# Patient Record
Sex: FEMALE | Race: ASIAN | NOT HISPANIC OR LATINO | Employment: PART TIME | URBAN - METROPOLITAN AREA
[De-identification: names, ages, dates, MRNs, and addresses within clinical notes are randomized per-mention and may not be internally consistent; named-entity substitution may affect disease eponyms.]

---

## 2017-09-14 ENCOUNTER — OFFICE VISIT (OUTPATIENT)
Dept: OPTOMETRY | Facility: CLINIC | Age: 62
End: 2017-09-14
Payer: COMMERCIAL

## 2017-09-14 DIAGNOSIS — H52.03 HYPEROPIA WITH ASTIGMATISM AND PRESBYOPIA, BILATERAL: Primary | ICD-10-CM

## 2017-09-14 DIAGNOSIS — H04.123 DRY EYES: ICD-10-CM

## 2017-09-14 DIAGNOSIS — H25.043 POSTERIOR SUBCAPSULAR AGE-RELATED CATARACT OF BOTH EYES: ICD-10-CM

## 2017-09-14 DIAGNOSIS — H52.203 HYPEROPIA WITH ASTIGMATISM AND PRESBYOPIA, BILATERAL: Primary | ICD-10-CM

## 2017-09-14 DIAGNOSIS — H25.13 NUCLEAR SCLEROSIS OF BOTH EYES: ICD-10-CM

## 2017-09-14 DIAGNOSIS — H02.403 PTOSIS, BILATERAL: ICD-10-CM

## 2017-09-14 DIAGNOSIS — H52.4 HYPEROPIA WITH ASTIGMATISM AND PRESBYOPIA, BILATERAL: Primary | ICD-10-CM

## 2017-09-14 PROCEDURE — 92015 DETERMINE REFRACTIVE STATE: CPT | Performed by: OPTOMETRIST

## 2017-09-14 PROCEDURE — 92004 COMPRE OPH EXAM NEW PT 1/>: CPT | Performed by: OPTOMETRIST

## 2017-09-14 ASSESSMENT — REFRACTION_MANIFEST
OD_CYLINDER: +1.50
OD_AXIS: 007
OS_CYLINDER: +1.25
OS_SPHERE: +1.25
OS_CYLINDER: +1.50
OS_AXIS: 007
OD_SPHERE: +1.50
METHOD_AUTOREFRACTION: 1
OS_AXIS: 001
OD_CYLINDER: +1.25
OS_ADD: +2.25
OS_SPHERE: +1.50
OD_ADD: +2.25
OD_SPHERE: +1.75
OD_AXIS: 005

## 2017-09-14 ASSESSMENT — TONOMETRY
OS_IOP_MMHG: 15
IOP_METHOD: APPLANATION
OD_IOP_MMHG: 15

## 2017-09-14 ASSESSMENT — CONF VISUAL FIELD
OD_NORMAL: 1
OS_NORMAL: 1
METHOD: COUNTING FINGERS

## 2017-09-14 ASSESSMENT — REFRACTION_WEARINGRX
OS_SPHERE: +3.25
OS_AXIS: 001
OD_CYLINDER: +1.00
OD_AXIS: 004
OD_SPHERE: +3.50
SPECS_TYPE: SVL
OS_CYLINDER: +0.75

## 2017-09-14 ASSESSMENT — EXTERNAL EXAM - RIGHT EYE: OD_EXAM: NORMAL

## 2017-09-14 ASSESSMENT — VISUAL ACUITY
OS_SC+: -1
CORRECTION_TYPE: GLASSES
OD_SC: 20/50
OS_SC: 20/50
METHOD: SNELLEN - LINEAR
OD_CC: J2
OS_CC: J1
OD_SC+: -2

## 2017-09-14 ASSESSMENT — EXTERNAL EXAM - LEFT EYE: OS_EXAM: NORMAL

## 2017-09-14 ASSESSMENT — CUP TO DISC RATIO
OS_RATIO: 0.3
OD_RATIO: 0.3

## 2017-09-14 NOTE — PROGRESS NOTES
Chief Complaint   Patient presents with     COMPREHENSIVE EYE EXAM      Accompanied by   Last Eye Exam: 3 years ago  Dilated Previously: No, side effects of dilation explained today    What are you currently using to see?  Glasses for reading       Distance Vision Acuity: Satisfied with vision    Near Vision Acuity: Not satisfied - When she wears her glasses for reading she gets headaches    Eye Comfort: burning in the morning  Do you use eye drops? : No  Occupation or Hobbies: [employer]  [employer] [facility]            Medical, surgical and family histories reviewed and updated 9/14/2017.       OBJECTIVE: See Ophthalmology exam    ASSESSMENT:    ICD-10-CM    1. Hyperopia with astigmatism and presbyopia, bilateral H52.03 EYE EXAM (SIMPLE-NONBILLABLE)    H52.203 REFRACTION    H52.4    2. Nuclear sclerosis of both eyes H25.13 EYE EXAM (SIMPLE-NONBILLABLE)   3. Dry eyes H04.123 EYE EXAM (SIMPLE-NONBILLABLE)   4. Ptosis, bilateral H02.403 EYE EXAM (SIMPLE-NONBILLABLE)     OPHTHALMOLOGY ADULT REFERRAL   5. Posterior subcapsular age-related cataract of both eyes H25.043       PLAN:     Patient Instructions   Prescription given for glasses.    Use a small amount of Refresh PM ointment in both eyes every night before bed.    You could try taking 1000 to 2000 mg of fish oil daily by mouth to improve tear layer.    Referral to Dr. Coles for eyelid evaluation.    Mild cataracts in both eyes. Protect eyes from the sun with sunglasses or a hat.

## 2017-09-14 NOTE — PATIENT INSTRUCTIONS
Prescription given for glasses.    Use a small amount of Refresh PM ointment in both eyes every night before bed.    You could try taking 1000 to 2000 mg of fish oil daily by mouth to improve tear layer.    Referral to Dr. Coles for eyelid evaluation.    Mild cataracts in both eyes. Protect eyes from the sun with sunglasses or a hat.

## 2017-09-14 NOTE — MR AVS SNAPSHOT
After Visit Summary   9/14/2017    Margaret Healy    MRN: [id number]           Patient Information     Date Of Birth          1955        Visit Information        Provider Department      9/14/2017 10:30 AM Laisha Rodriguez OD; [employer] [facility]        Today's Diagnoses     Hyperopia with astigmatism and presbyopia, bilateral    -  1    Nuclear sclerosis of both eyes        Dry eyes        Ptosis, bilateral          Care Instructions    Prescription given for glasses.    Use a small amount of Refresh PM ointment in both eyes every night before bed.    You could try taking 1000 to 2000 mg of fish oil daily by mouth to improve tear layer.    Referral to Dr. Coles for eyelid evaluation.    Mild cataracts in both eyes. Protect eyes from the sun with sunglasses or a hat.              Follow-ups after your visit        Your next 10 appointments already scheduled     Sep 22, 2017  9:20 AM CDT   PHYSICAL with JAQUAN Pa CNP   [facility]    [address] [place] [id number] [phone]              Who to contact     If you have questions or need follow up information about today's clinic visit or your schedule please contact [facility] directly at [phone].  Normal or non-critical lab and imaging results will be communicated to you by MyChart, letter or phone within 4 business days after the clinic has received the results. If you do not hear from us within 7 days, please contact the clinic through MyChart or phone. If you have a critical or abnormal lab result, we will notify you by phone as soon as possible.  Submit refill requests through [employer] or call your pharmacy and they will forward the refill request to us. Please allow 3 business days for your refill to be completed.          Additional Information About Your Visit        MyChart Information   "   [employer] lets you send messages to your doctor, view your test results, renew your prescriptions, schedule appointments and more. To sign up, go to [email or web address] . Click on \"Log in\" on the left side of the screen, which will take you to the Welcome page. Then click on \"Sign up Now\" on the right side of the page.     You will be asked to enter the access code listed below, as well as some personal information. Please follow the directions to create your username and password.     Your access code is: [email or web address]  Expires: 12/10/2017  9:46 AM     Your access code will  in 90 days. If you need help or a new code, please call your [place] clinic or [phone].        Care EveryWhere ID     This is your Care EveryWhere ID. This could be used by other organizations to access your [place] medical records  [id number]         Blood Pressure from Last 3 Encounters:   No data found for BP    Weight from Last 3 Encounters:   No data found for Wt              Today, you had the following     No orders found for display       Primary Care Provider    None Specified       No primary provider on file.        Equal Access to Services     [facility]: Hadii vivek Cortez, wasofieda shanelle, qajoseta ameliaalmaria c mcgee, don bass . So [facility] [phone].    ATENCIÓN: Si habla español, tiene a arteaga disposición servicios gratuitos de asistencia lingüística. Llame al [phone].    We comply with applicable federal civil rights laws and [place] laws. We do not discriminate on the basis of race, color, national origin, age, disability sex, sexual orientation or gender identity.            Thank you!     Thank you for choosing [facility]  for your care. Our goal is always to provide you with excellent care. Hearing back from our patients is one way we can continue to improve our services. Please take a few minutes to complete the written survey that you " may receive in the mail after your visit with us. Thank you!             Your Updated Medication List - Protect others around you: Learn how to safely use, store and throw away your medicines at www.disposemymeds.org.          This list is accurate as of: 9/14/17 11:45 AM.  Always use your most recent med list.                   Brand Name Dispense Instructions for use Diagnosis    biotin 2.5 mg/mL Susp      Take by mouth daily        FISH OIL PO           VITAMIN D (CHOLECALCIFEROL) PO      Take by mouth daily

## 2017-09-22 ENCOUNTER — OFFICE VISIT (OUTPATIENT)
Dept: FAMILY MEDICINE | Facility: CLINIC | Age: 62
End: 2017-09-22
Payer: COMMERCIAL

## 2017-09-22 VITALS
BODY MASS INDEX: 21.09 KG/M2 | HEART RATE: 71 BPM | SYSTOLIC BLOOD PRESSURE: 115 MMHG | TEMPERATURE: 97.8 F | HEIGHT: 59 IN | DIASTOLIC BLOOD PRESSURE: 74 MMHG | WEIGHT: 104.6 LBS | OXYGEN SATURATION: 97 %

## 2017-09-22 DIAGNOSIS — Z12.31 VISIT FOR SCREENING MAMMOGRAM: ICD-10-CM

## 2017-09-22 DIAGNOSIS — Z00.00 ROUTINE HISTORY AND PHYSICAL EXAMINATION OF ADULT: Primary | ICD-10-CM

## 2017-09-22 DIAGNOSIS — Z11.3 SCREEN FOR STD (SEXUALLY TRANSMITTED DISEASE): ICD-10-CM

## 2017-09-22 DIAGNOSIS — Z12.4 SCREENING FOR MALIGNANT NEOPLASM OF CERVIX: ICD-10-CM

## 2017-09-22 DIAGNOSIS — Z13.6 CARDIOVASCULAR SCREENING; LDL GOAL LESS THAN 160: ICD-10-CM

## 2017-09-22 DIAGNOSIS — R42 DIZZINESS: ICD-10-CM

## 2017-09-22 DIAGNOSIS — Z23 NEED FOR PROPHYLACTIC VACCINATION AND INOCULATION AGAINST INFLUENZA: ICD-10-CM

## 2017-09-22 LAB
ALBUMIN SERPL-MCNC: 3.9 G/DL (ref 3.4–5)
ALP SERPL-CCNC: 108 U/L (ref 40–150)
ALT SERPL W P-5'-P-CCNC: 24 U/L (ref 0–50)
ANION GAP SERPL CALCULATED.3IONS-SCNC: 8 MMOL/L (ref 3–14)
AST SERPL W P-5'-P-CCNC: 25 U/L (ref 0–45)
BILIRUB SERPL-MCNC: 1 MG/DL (ref 0.2–1.3)
BUN SERPL-MCNC: 9 MG/DL (ref 7–30)
CALCIUM SERPL-MCNC: 8.9 MG/DL (ref 8.5–10.1)
CHLORIDE SERPL-SCNC: 102 MMOL/L (ref 94–109)
CHOLEST SERPL-MCNC: 233 MG/DL
CO2 SERPL-SCNC: 29 MMOL/L (ref 20–32)
CREAT SERPL-MCNC: 0.69 MG/DL (ref 0.52–1.04)
ERYTHROCYTE [DISTWIDTH] IN BLOOD BY AUTOMATED COUNT: 12.1 % (ref 10–15)
FERRITIN SERPL-MCNC: 105 NG/ML (ref 8–252)
FOLATE SERPL-MCNC: 23.9 NG/ML
GFR SERPL CREATININE-BSD FRML MDRD: 87 ML/MIN/1.7M2
GLUCOSE SERPL-MCNC: 91 MG/DL (ref 70–99)
HCT VFR BLD AUTO: 37.4 % (ref 35–47)
HDLC SERPL-MCNC: 72 MG/DL
HGB BLD-MCNC: 12.7 G/DL (ref 11.7–15.7)
IRON SATN MFR SERPL: 45 % (ref 15–46)
IRON SERPL-MCNC: 159 UG/DL (ref 35–180)
LDLC SERPL CALC-MCNC: 129 MG/DL
MCH RBC QN AUTO: 32.2 PG (ref 26.5–33)
MCHC RBC AUTO-ENTMCNC: 34 G/DL (ref 31.5–36.5)
MCV RBC AUTO: 95 FL (ref 78–100)
NONHDLC SERPL-MCNC: 161 MG/DL
PLATELET # BLD AUTO: 201 10E9/L (ref 150–450)
POTASSIUM SERPL-SCNC: 3.9 MMOL/L (ref 3.4–5.3)
PROT SERPL-MCNC: 8.3 G/DL (ref 6.8–8.8)
RBC # BLD AUTO: 3.94 10E12/L (ref 3.8–5.2)
SODIUM SERPL-SCNC: 139 MMOL/L (ref 133–144)
SPECIMEN SOURCE: NORMAL
TIBC SERPL-MCNC: 351 UG/DL (ref 240–430)
TRIGL SERPL-MCNC: 161 MG/DL
TSH SERPL DL<=0.005 MIU/L-ACNC: 0.66 MU/L (ref 0.4–4)
VIT B12 SERPL-MCNC: 567 PG/ML (ref 193–986)
WBC # BLD AUTO: 4.6 10E9/L (ref 4–11)
WET PREP SPEC: NORMAL

## 2017-09-22 PROCEDURE — 87591 N.GONORRHOEAE DNA AMP PROB: CPT | Performed by: NURSE PRACTITIONER

## 2017-09-22 PROCEDURE — 90686 IIV4 VACC NO PRSV 0.5 ML IM: CPT | Performed by: NURSE PRACTITIONER

## 2017-09-22 PROCEDURE — G0476 HPV COMBO ASSAY CA SCREEN: HCPCS | Performed by: NURSE PRACTITIONER

## 2017-09-22 PROCEDURE — 99396 PREV VISIT EST AGE 40-64: CPT | Mod: 25 | Performed by: NURSE PRACTITIONER

## 2017-09-22 PROCEDURE — 90471 IMMUNIZATION ADMIN: CPT | Performed by: NURSE PRACTITIONER

## 2017-09-22 PROCEDURE — G0123 SCREEN CERV/VAG THIN LAYER: HCPCS | Performed by: NURSE PRACTITIONER

## 2017-09-22 PROCEDURE — 82728 ASSAY OF FERRITIN: CPT | Performed by: NURSE PRACTITIONER

## 2017-09-22 PROCEDURE — 80053 COMPREHEN METABOLIC PANEL: CPT | Performed by: NURSE PRACTITIONER

## 2017-09-22 PROCEDURE — 84443 ASSAY THYROID STIM HORMONE: CPT | Performed by: NURSE PRACTITIONER

## 2017-09-22 PROCEDURE — 83540 ASSAY OF IRON: CPT | Performed by: NURSE PRACTITIONER

## 2017-09-22 PROCEDURE — 82607 VITAMIN B-12: CPT | Performed by: NURSE PRACTITIONER

## 2017-09-22 PROCEDURE — 85027 COMPLETE CBC AUTOMATED: CPT | Performed by: NURSE PRACTITIONER

## 2017-09-22 PROCEDURE — 83550 IRON BINDING TEST: CPT | Performed by: NURSE PRACTITIONER

## 2017-09-22 PROCEDURE — 82746 ASSAY OF FOLIC ACID SERUM: CPT | Performed by: NURSE PRACTITIONER

## 2017-09-22 PROCEDURE — 99213 OFFICE O/P EST LOW 20 MIN: CPT | Mod: 25 | Performed by: NURSE PRACTITIONER

## 2017-09-22 PROCEDURE — 36415 COLL VENOUS BLD VENIPUNCTURE: CPT | Performed by: NURSE PRACTITIONER

## 2017-09-22 PROCEDURE — 80061 LIPID PANEL: CPT | Performed by: NURSE PRACTITIONER

## 2017-09-22 PROCEDURE — 87491 CHLMYD TRACH DNA AMP PROBE: CPT | Performed by: NURSE PRACTITIONER

## 2017-09-22 PROCEDURE — 87210 SMEAR WET MOUNT SALINE/INK: CPT | Performed by: NURSE PRACTITIONER

## 2017-09-22 NOTE — PROGRESS NOTES
SUBJECTIVE:   CC: Margaret Healy is an 62 year old woman who presents for preventive health visit.     Healthy Habits:    Do you get at least three servings of calcium containing foods daily (dairy, green leafy vegetables, etc.)? yes    Amount of exercise or daily activities, outside of work: 7 day(s) per week    Problems taking medications regularly No    Medication side effects: No    Have you had an eye exam in the past two years? yes    Do you see a dentist twice per year? yes    Do you have sleep apnea, excessive snoring or daytime drowsiness?yes      Concern: Dizziness- intermittent 1-2 days/month, worse in the am and lasts  A couple minutes, symptoms worse with position changes and when she turns her head.  She reports history of possible allergy symptoms at the time she has symptoms, no /hx of inner ear problems.  No falls, no weakness, nausea, vomiting, vision changes, numbness or tingling.  She was recently seen by Optometry and the power of her eyeglasses was decreased.  She  Has history of anemia- thought to be due to poor nutrition.    Today's PHQ-2 Score: PHQ-2 ( 1999 Pfizer) 9/22/2017   Q1: Little interest or pleasure in doing things 0   Q2: Feeling down, depressed or hopeless 0   PHQ-2 Score 0         Abuse: Current or Past(Physical, Sexual or Emotional)- No  Do you feel safe in your environment - Yes  Social History   Substance Use Topics     Smoking status: Never Smoker     Smokeless tobacco: Never Used     Alcohol use No     The patient does not drink >3 drinks per day nor >7 drinks per week.    Reviewed orders with patient.  Reviewed health maintenance and updated orders accordingly - Yes  BP Readings from Last 3 Encounters:   09/22/17 116/67    Wt Readings from Last 3 Encounters:   09/22/17 104 lb 9.6 oz (47.4 kg)                  There is no problem list on file for this patient.    History reviewed. No pertinent surgical history.    Social History   Substance Use Topics     Smoking status:  "Never Smoker     Smokeless tobacco: Never Used     Alcohol use No     Family History   Problem Relation Age of Onset     Cataracts Mother      Hypertension Mother      Glaucoma No family hx of      Macular Degeneration No family hx of                Patient over age 50, mutual decision to screen reflected in health maintenance.      Pertinent mammograms are reviewed under the imaging tab.  History of abnormal Pap smear: NO - age 30- 65 PAP every 3 years recommended    Reviewed and updated as needed this visit by clinical staffTobacco  Allergies  Meds  Med Hx  Surg Hx  Fam Hx  Soc Hx        Reviewed and updated as needed this visit by Provider        History reviewed. No pertinent past medical history.   History reviewed. No pertinent surgical history.    ROS:  C: NEGATIVE for fever, chills, change in weight  I: NEGATIVE for worrisome rashes, moles or lesions  E: NEGATIVE for vision changes or irritation  ENT: NEGATIVE for ear, mouth and throat problems  R: NEGATIVE for significant cough or SOB  B: NEGATIVE for masses, tenderness or discharge  CV: NEGATIVE for chest pain, palpitations or peripheral edema  GI: NEGATIVE for nausea, abdominal pain, heartburn, or change in bowel habits  : NEGATIVE for unusual urinary or vaginal symptoms. No vaginal bleeding.  M: NEGATIVE for significant arthralgias or myalgia  N: NEGATIVE for weakness, dizziness or paresthesias  P: NEGATIVE for changes in mood or affect     OBJECTIVE:   /67 (BP Location: Left arm, Patient Position: Sitting, Cuff Size: Adult Regular)  Pulse 74  Temp 97.8  F (36.6  C) (Oral)  Ht 4' 11.45\" (1.51 m)  Wt 104 lb 9.6 oz (47.4 kg)  SpO2 97%  BMI 20.81 kg/m2  EXAM:  GENERAL APPEARANCE: healthy, alert and no distress  EYES: Eyes grossly normal to inspection, PERRL and conjunctivae and sclerae normal  HENT: ear canals and TM's normal, nose and mouth without ulcers or lesions, oropharynx clear and oral mucous membranes moist  NECK: no adenopathy, " no asymmetry, masses, or scars and thyroid normal to palpation  RESP: lungs clear to auscultation - no rales, rhonchi or wheezes  BREAST: normal without masses, tenderness or nipple discharge and no palpable axillary masses or adenopathy  CV: regular rate and rhythm, normal S1 S2, no S3 or S4, no murmur, click or rub, no peripheral edema and peripheral pulses strong  ABDOMEN: soft, nontender, no hepatosplenomegaly, no masses and bowel sounds normal   (female): normal female external genitalia, normal urethral meatus, vaginal mucosal atrophy noted, normal cervix, adnexae, and uterus without masses or abnormal discharge, wet prep, pap, GC obtained.  MS: no musculoskeletal defects are noted and gait is age appropriate without ataxia  SKIN: no suspicious lesions or rashes  NEURO: Normal strength and tone, sensory exam grossly normal, mentation intact and speech normal  PSYCH: mentation appears normal and affect normal/bright    ASSESSMENT/PLAN:   1. Routine history and physical examination of adult      2. Dizziness  Not orthostatic, make position changes slowly, reviewed home safety, checking labs today.  Return to clinic if not improved, new, or worsening symptoms.   - Comprehensive metabolic panel (BMP + Alb, Alk Phos, ALT, AST, Total. Bili, TP)  - CBC with platelets  - Ferritin  - Iron and iron binding capacity  - Folate  - Vitamin B12  - TSH with free T4 reflex    3. CARDIOVASCULAR SCREENING; LDL GOAL LESS THAN 160    - Lipid panel reflex to direct LDL    4. Screening for malignant neoplasm of cervix    - Pap imaged thin layer screen with HPV - recommended age 30 - 65 years (select HPV order below)    5. Visit for screening mammogram    - MA SCREENING DIGITAL BILAT - Future  (s+30); Future    6. Need for prophylactic vaccination and inoculation against influenza    7.  Screen std  -GC/wet prep obtained at patient request    COUNSELING:   Reviewed preventive health counseling, as reflected in patient  "instructions       Regular exercise       Healthy diet/nutrition       Immunizations    Vaccinated for: Influenza           Osteoporosis Prevention/Bone Health       Safe sex practices/STD prevention           reports that she has never smoked. She has never used smokeless tobacco.    Estimated body mass index is 20.81 kg/(m^2) as calculated from the following:    Height as of this encounter: 4' 11.45\" (1.51 m).    Weight as of this encounter: 104 lb 9.6 oz (47.4 kg).         Counseling Resources:  ATP IV Guidelines  Pooled Cohorts Equation Calculator  Breast Cancer Risk Calculator  FRAX Risk Assessment  ICSI Preventive Guidelines  Dietary Guidelines for Americans, 2010  USDA's MyPlate  ASA Prophylaxis  Lung CA Screening    JAQUAN Hall [facility]  "

## 2017-09-22 NOTE — PATIENT INSTRUCTIONS
Based on your medical history and these are the current health maintenance or preventive care services that you are due for (some may have been done at this visit)  Health Maintenance Due   Topic Date Due     HEPATITIS C SCREENING  08/07/1973     PAP SCREENING Q3 YR (SYSTEM ASSIGNED)  08/07/1976     LIPID SCREEN Q5 YR FEMALE (SYSTEM ASSIGNED)  08/07/2000     MAMMO SCREEN Q2 YR (SYSTEM ASSIGNED)  08/07/2005     COLON CANCER SCREEN (SYSTEM ASSIGNED)  08/07/2005     ADVANCE DIRECTIVE PLANNING Q5 YRS  08/07/2010     INFLUENZA VACCINE (SYSTEM ASSIGNED)  09/01/2017         At [facility], we strive to deliver an exceptional experience to you, every time we see you.    If you receive a survey in the mail, please send us back your thoughts. We really do value your feedback.    Your care team's suggested websites for health information:  Www.[employer] : Up to date and easily searchable information on multiple topics.  Www.medlineplus.gov : medication info, interactive tutorials, watch real surgeries online  Www.familydoctor.org : good info from the Academy of Family Physicians  Www.cdc.gov : public health info, travel advisories, epidemics (H1N1)  Www.aap.org : children's health info, normal development, vaccinations  Www.health.[facility].us : [place] dept of health, public health issues in [place], N1N1    How to contact your care team:   Álvaro Maldonado/Jaret [phone]         Pharmacy [phone]    Dr. Mcgarry, Rosalia Cooley PA-C, Dr. Montalvo, Saray PARTIDA CNP, Mya Corey PA-C, Dr. Roe, and JAQUAN Quach CNP    Team RN: [place]      Clinic hours  M-Th 7 am-7 pm   Fri 7 am-5 pm.   Urgent care M-F 11 am-9 pm,   Sat/Sun 9 am-5 pm.  Pharmacy M-Th 8 am-8 pm Fri 8 am-6 pm  Sat/Sun 9 am-5 pm.     All password changes, disabled accounts, or ID changes in SMS Assistt/MyHealth will be done by our Access Services Department.    If you need help with your account or password, call:  [phone]. Clinic staff no longer has the ability to change passwords.     Preventive Health Recommendations  Female Ages 50 - 64    Yearly exam: See your health care provider every year in order to  o Review health changes.   o Discuss preventive care.    o Review your medicines if your doctor has prescribed any.      Get a Pap test every three years (unless you have an abnormal result and your provider advises testing more often).    If you get Pap tests with HPV test, you only need to test every 5 years, unless you have an abnormal result.     You do not need a Pap test if your uterus was removed (hysterectomy) and you have not had cancer.    You should be tested each year for STDs (sexually transmitted diseases) if you're at risk.     Have a mammogram every 1 to 2 years.    Have a colonoscopy at age 50, or have a yearly FIT test (stool test). These exams screen for colon cancer.      Have a cholesterol test every 5 years, or more often if advised.    Have a diabetes test (fasting glucose) every three years. If you are at risk for diabetes, you should have this test more often.     If you are at risk for osteoporosis (brittle bone disease), think about having a bone density scan (DEXA).    Shots: Get a flu shot each year. Get a tetanus shot every 10 years.    Nutrition:     Eat at least 5 servings of fruits and vegetables each day.    Eat whole-grain bread, whole-wheat pasta and brown rice instead of white grains and rice.    Talk to your provider about Calcium and Vitamin D.     Lifestyle    Exercise at least 150 minutes a week (30 minutes a day, 5 days a week). This will help you control your weight and prevent disease.    Limit alcohol to one drink per day.    No smoking.     Wear sunscreen to prevent skin cancer.     See your dentist every six months for an exam and cleaning.    See your eye doctor every 1 to 2 years.    Chóng M?t [Dizziness, Uncertain Cause]  Chóng m?t (dizziness) là m?t tri?u ch? ng  "thông th? ? ng ?ôi khi ?? ?c mô t? nh? là  choáng váng  (\"lightheadedness\") ho?c c? m giác nh? qu ý v? s?p ng?t x?u. N?u chóng m?t ch? kéo dài m?t vài giây ho? c liên lakisha ? ?n s? thay ? ?i v? trí ( nh? ? ?ng d?y tia khi n?m ho?c ng?i m?t lúc lâu), thì nó th ? ?ng không ph?i là d?u hi?u c?a m? t ?i ?u gì ?ó tr ?m tr?ng. N?u chóng m?t kéo dài t? vài phút ? ?n vài gi?, ho?c ti?p t?c mà không có lý do gì rõ ràng thì có th? là d?u hi?u c?a m?t v? n ? ? tr?m tr? ng h?n ( nh? m ? t n? ?c, ph?n ?ng v?i thu?c, b?nh peggy ho?c não).  Cu? c th?m khám ngày hôm nay không cho th?y nguyên nhân chính xác c? a c?n chóng m ?t c?a quý v? . ?ôi lúc c ?n làm các xét barbie?m b? sung tr? ?c khi tìm ra ?? ?c nguyên nhân. Do ?ó, ?i ?u lakisha tr?ng là quý v? ph?i ti?p t?c devante dõi v?i bác s? c?a quý v? n?u các tri?u ch?ng ti?p t?c.  Ch?m Sóc T?i Stacy:  1) N?u m? t c?n chóng m ?t x?y ra và kéo dài quá vài giây, hãy n?m del angel? ng cho ? ? n khi c?n chóng m ? t taryn ?i. N?u quý v? n?m del angel?ng, quý v? s? không th? t? làm cho mình t? n th??ng b ?ng cách té del angel?ng n?u quý v? ng?t x?u.  2) ??ng lái xe ho? c ?i ?u khi?n thi?t b? gì nguy hi?m cho ? ?n khi d?t c?n chóng m?t ít nh?t là 48 gi?.  3) N? u c?n chóng m?t x?y ra khi ? ?ng d? y ? ?t ng?t thì ?ó có th ? là d?u hi?u c?a s? m? t n? ?c nh?. U?ng thêm ch?t l?ng lorraine nh?ng ngày ti? p ?ó .  4) N?u quý v? v?a m?i b? t ? ?u dùng m?t lo?i thu?c m?i ho?c n?u quý v? ? ã t ?ng li ? u l? ?ng thu?c hi?n dùng ( ? ?c bi?t là thu?c tr? burgess lilo?t áp), hãy nói navya?n v?i bác s? kê toa v? các tri?u ch?ng c?a quý v?. Có th? ph?i c? n ?i ?u ch?nh li? u l? ?ng thu?c.  Ti?p T?c Devante Dõi  v?i bác s? c?a quý v? ? ? th? m ? ?nh thêm lorraine vòng b?y ngày t?i n?u các tri?u ch?ng c?a quý v? ti?p t?c.  N?u x?y ra b?t c? ?i?u nào tia ?ây hãy L?P T?C ?I?U TR? Y T?:  -- Các tri?u ch?ng c?a quý v? t? h?n  -- Ng?t x?u, nh? c ? ?u ho?c ? ?ng kinh  -- Ói m?a bernardino?u l?n  -- Có c? m giác nh? là qu ý v? ho? c c?n ph òng ?ang connie tr òn  -- ?michell cervantes " ?c, cánh soila, c? , l?ng ho ?c hàm  -- ?ánh tr ?ng ng?c (c?m giác là peggy c?a quý v? rung, ? ?p nhanh, ho?c m?nh)  -- H?i th ? ng?n  -- Có máu lorraine ch?t ói m?a ho?c lorraine phâ n (màu ?en hay ? ?)  -- Y?u m?t cánh soila ho?c m?t c?ng chân ho?c m?t bên m?t  -- Nói ho?c nhìn khó kh ?n  Date Last Reviewed: 8/23/2015 2000-2017 [employer]. [address] [place] [address], [place] [address]. All rights reserved. This information is not intended as a substitute for professional medical care. Always follow your healthcare professional's instructions.

## 2017-09-22 NOTE — MR AVS SNAPSHOT
After Visit Summary   9/22/2017    Margaret Healy    MRN: [id number]           Patient Information     Date Of Birth          1955        Visit Information        Provider Department      9/22/2017 9:15 AM Amparo Hernandez APRN CNP; [employer] [facility]        Today's Diagnoses     Routine history and physical examination of adult    -  1    Dizziness        CARDIOVASCULAR SCREENING; LDL GOAL LESS THAN 160        Screening for malignant neoplasm of cervix        Visit for screening mammogram        Need for prophylactic vaccination and inoculation against influenza          Care Instructions    Based on your medical history and these are the current health maintenance or preventive care services that you are due for (some may have been done at this visit)  Health Maintenance Due   Topic Date Due     HEPATITIS C SCREENING  08/07/1973     PAP SCREENING Q3 YR (SYSTEM ASSIGNED)  08/07/1976     LIPID SCREEN Q5 YR FEMALE (SYSTEM ASSIGNED)  08/07/2000     MAMMO SCREEN Q2 YR (SYSTEM ASSIGNED)  08/07/2005     COLON CANCER SCREEN (SYSTEM ASSIGNED)  08/07/2005     ADVANCE DIRECTIVE PLANNING Q5 YRS  08/07/2010     INFLUENZA VACCINE (SYSTEM ASSIGNED)  09/01/2017         At [facility], we strive to deliver an exceptional experience to you, every time we see you.    If you receive a survey in the mail, please send us back your thoughts. We really do value your feedback.    Your care team's suggested websites for health information:  Www.MobiPixie.org : Up to date and easily searchable information on multiple topics.  Www.medlineplus.gov : medication info, interactive tutorials, watch real surgeries online  Www.familydoctor.org : good info from the Academy of Family Physicians  Www.cdc.gov : public health info, travel advisories, epidemics (H1N1)  Www.aap.org : children's health info, normal development, vaccinations  Www.health.[facility].[place].us : [place] dept of  health, public health issues in [place], N1N1    How to contact your care team:   Team Erica[facility]Spirit [phone]         Pharmacy [phone]    Dr. Mcgarry, Rosalia Cooley PA-C, Dr. Montalvo, Saray Hooker APRN CNP, Mya Corey PA-C, Dr. Roe, and Annika Hernandez, JAQUAN CNP    Team RN: Gilma      Clinic hours  M-Th 7 am-7 pm   Fri 7 am-5 pm.   Urgent care M-F 11 am-9 pm,   Sat/Sun 9 am-5 pm.  Pharmacy M-Th 8 am-8 pm Fri 8 am-6 pm  Sat/Sun 9 am-5 pm.     All password changes, disabled accounts, or ID changes in [employer]/MyHealth will be done by our Access Services Department.    If you need help with your account or password, call: [phone]. Clinic staff no longer has the ability to change passwords.     Preventive Health Recommendations  Female Ages 50 - 64    Yearly exam: See your health care provider every year in order to  o Review health changes.   o Discuss preventive care.    o Review your medicines if your doctor has prescribed any.      Get a Pap test every three years (unless you have an abnormal result and your provider advises testing more often).    If you get Pap tests with HPV test, you only need to test every 5 years, unless you have an abnormal result.     You do not need a Pap test if your uterus was removed (hysterectomy) and you have not had cancer.    You should be tested each year for STDs (sexually transmitted diseases) if you're at risk.     Have a mammogram every 1 to 2 years.    Have a colonoscopy at age 50, or have a yearly FIT test (stool test). These exams screen for colon cancer.      Have a cholesterol test every 5 years, or more often if advised.    Have a diabetes test (fasting glucose) every three years. If you are at risk for diabetes, you should have this test more often.     If you are at risk for osteoporosis (brittle bone disease), think about having a bone density scan (DEXA).    Shots: Get a flu shot each year. Get a tetanus shot every 10 years.    Nutrition:  "    Eat at least 5 servings of fruits and vegetables each day.    Eat whole-grain bread, whole-wheat pasta and brown rice instead of white grains and rice.    Talk to your provider about Calcium and Vitamin D.     Lifestyle    Exercise at least 150 minutes a week (30 minutes a day, 5 days a week). This will help you control your weight and prevent disease.    Limit alcohol to one drink per day.    No smoking.     Wear sunscreen to prevent skin cancer.     See your dentist every six months for an exam and cleaning.    See your eye doctor every 1 to 2 years.    Chóng M?t [Dizziness, Uncertain Cause]  Chóng m?t (dizziness) là m?t tri?u ch? ng thông th? ? ng ?ôi khi ?? ?c mô t? nh? là  choáng váng  (\"lightheadedness\") ho?c c? m giác nh? qu ý v? s?p ng?t x?u. N?u chóng m?t ch? kéo dài m?t vài giây ho? c liên lakisha ? ?n s? thay ? ?i v? trí ( nh? ? ?ng d?y tia khi n?m ho?c ng?i m?t lúc lâu), thì nó th ? ?ng không ph?i là d?u hi?u c?a m? t ?i ?u gì ?ó tr ?m tr?ng. N?u chóng m?t kéo dài t? vài phút ? ?n vài gi?, ho?c ti?p t?c mà không có lý do gì rõ ràng thì có th? là d?u hi?u c?a m?t v? n ? ? tr?m tr? ng h?n ( nh? m ? t n? ?c, ph?n ?ng v?i thu?c, b?nh peggy ho?c não).  Cu? c th?m khám ngày hôm nay không cho th?y nguyên nhân chính xác c? a c?n chóng m ?t c?a quý v? . ?ôi lúc c ?n làm các xét barbie?m b? sung tr? ?c khi tìm ra ?? ?c nguyên nhân. Do ?ó, ?i ?u lakisha tr?ng là quý v? ph?i ti?p t?c severino dõi v?i bác s? c?a quý v? n?u các tri?u ch?ng ti?p t?c.  Ch?m Sóc T?i Stacy:  1) N?u m? t c?n chóng m ?t x?y ra và kéo dài quá vài giây, hãy n?m del angel? ng cho ? ? n khi c?n chóng m ? t taryn ?i. N?u quý v? n?m del angel?ng, quý v? s? không th? t? làm cho mình t? n th??ng b ?ng cách té del angel?ng n?u quý v? ng?t x?u.  2) ??ng lái xe ho? c ?i ?u khi?n thi?t b? gì nguy hi?m cho ? ?n khi d?t c?n chóng m?t ít nh?t là 48 gi?.  3) N? u c?n chóng m?t x?y ra khi ? ?ng d? y ? ?t ng?t thì ?ó có th ? là d?u hi?u c?a s? m? t n? ?c nh?. U?ng thêm ch?t l?ng lorraine nh?ng " ngày ti? p ?ó .  4) N?u quý v? v?a m?i b? t ? ?u dùng m?t lo?i thu?c m?i ho?c n?u quý v? ? ã t ?ng li ? u l? ?ng thu?c hi?n dùng ( ? ?c bi?t là thu?c tr? burgess lilo?t áp), hãy nói navya?n v?i bác s? kê toa v? các tri?u ch?ng c?a quý v?. Có th? ph?i c? n ?i ?u ch?nh li? u l? ?ng thu?c.  Ti?p T?c Devante Dõi  v?i bác s? c?a quý v? ? ? th? m ? ?nh thêm lorraine vòng b?y ngày t?i n?u các tri?u ch?ng c?a quý v? ti?p t?c.  N?u x?y ra b?t c? ?i?u nào tia ?ây hãy L?P T?C ?I?U TR? Y T?:  -- Các tri?u ch?ng c?a quý v? t? h?n  -- Ng?t x?u, nh? c ? ?u ho?c ? ?ng kinh  -- Ói m?a bernardino?u l?n  -- Có c? m giác nh? là qu ý v? ho? c c?n ph òng ?ang connie tr òn  -- ?au ng ?c, cánh soila, c? , l?ng ho ?c hàm  -- ?ánh tr ?ng ng?c (c?m giác là peggy c?a quý v? rung, ? ?p nhanh, ho?c m?nh)  -- H?i th ? ng?n  -- Có máu lorraine ch?t ói m?a ho?c lorraine phâ n (màu ?en hay ? ?)  -- Y?u m?t cánh soila ho?c m?t c?ng chân ho?c m?t bên m?t  -- Nói ho?c nhìn khó kh ?n  Date Last Reviewed: 8/23/2015 2000-2017 [employer]. [address] [place] [address], [place] [address]. All rights reserved. This information is not intended as a substitute for professional medical care. Always follow your healthcare professional's instructions.                Follow-ups after your visit        Your next 10 appointments already scheduled     Sep 23, 2017 12:30 PM CDT   MA SCREENING DIGITAL BILATERAL with [id number]   [facility]    [address] [place] [id number] [phone]           Do not use any powder, lotion or deodorant under your arms or on your breast. If you do, we will ask you to remove it before your exam.  Wear comfortable, two-piece clothing.  If you have any allergies, tell your care team.  Bring any previous mammograms from other facilities or have them mailed to the breast center. Three-dimensional (3D) mammograms are available at [facility] locations in [place], [place], [place], [place],  "[facility], [place], [place], and [place]. [facility] locations include [facility] and [facility] in [place]. Benefits of 3D mammograms include: - Improved rate of cancer detection - Decreases your chance of having to go back for more tests, which means fewer: - \"False-positive\" results (This means that there is an abnormal area but it isn't cancer.) - Invasive testing procedures, such as a biopsy or surgery - Can provide clearer images of the breast if you have dense breast tissue. 3D mammography is an optional exam that anyone can have with a 2D mammogram. It doesn't replace or take the place of a 2D mammogram. 2D mammograms remain an effective screening test for all women.  Not all insurance companies cover the cost of a 3D mammogram. Check with your insurance.              Future tests that were ordered for you today     Open Future Orders        Priority Expected Expires Ordered    MA SCREENING DIGITAL BILAT - Future  (s+30) Routine  9/22/2018 9/22/2017            Who to contact     If you have questions or need follow up information about today's clinic visit or your schedule please contact [facility] directly at [phone].  Normal or non-critical lab and imaging results will be communicated to you by [facility], letter or phone within 4 business days after the clinic has received the results. If you do not hear from us within 7 days, please contact the clinic through Hers[facility] or phone. If you have a critical or abnormal lab result, we will notify you by phone as soon as possible.  Submit refill requests through [employer] or call your pharmacy and they will forward the refill request to us. Please allow 3 business days for your refill to be completed.          Additional Information About Your Visit        THE ICONIC Information     [employer] lets you send messages to your doctor, view your test results, renew your prescriptions, schedule appointments and more. To sign up, " "go to [email or web address] . Click on \"Log in\" on the left side of the screen, which will take you to the Welcome page. Then click on \"Sign up Now\" on the right side of the page.     You will be asked to enter the access code listed below, as well as some personal information. Please follow the directions to create your username and password.     Your access code is: [email or web address]  Expires: 12/10/2017  9:46 AM     Your access code will  in 90 days. If you need help or a new code, please call your [place] clinic or [phone].        Care EveryWhere ID     This is your Care EveryWhere ID. This could be used by other organizations to access your [place] medical records  [id number]        Your Vitals Were     Pulse Temperature Height Pulse Oximetry BMI (Body Mass Index)       71 97.8  F (36.6  C) (Oral) 4' 11.45\" (1.51 m) 97% 20.81 kg/m2        Blood Pressure from Last 3 Encounters:   17 115/74    Weight from Last 3 Encounters:   17 104 lb 9.6 oz (47.4 kg)              We Performed the Following     CBC with platelets     Comprehensive metabolic panel (BMP + Alb, Alk Phos, ALT, AST, Total. Bili, TP)     Ferritin     Folate     Iron and iron binding capacity     Lipid panel reflex to direct LDL     Pap imaged thin layer screen with HPV - recommended age 30 - 65 years (select HPV order below)     TSH with free T4 reflex     Vitamin B12        Primary Care Provider    None Specified       No primary provider on file.        Equal Access to Services     MARIAJOSE IZAGUIRRE : Hadii vivek ku hadasho Soomaali, waaxda luqadaha, qaybta kaalmada adeegyada, don bass . So [facility] [phone].    ATENCIÓN: Si habla español, tiene a arteaga disposición servicios gratuitos de asistencia lingüística. Llame al [phone].    We comply with applicable federal civil rights laws and [place] laws. We do not discriminate on the basis of race, color, national origin, age, disability sex, sexual " orientation or gender identity.            Thank you!     Thank you for choosing [facility]  for your care. Our goal is always to provide you with excellent care. Hearing back from our patients is one way we can continue to improve our services. Please take a few minutes to complete the written survey that you may receive in the mail after your visit with us. Thank you!             Your Updated Medication List - Protect others around you: Learn how to safely use, store and throw away your medicines at www.disposemymeds.org.          This list is accurate as of: 9/22/17 10:36 AM.  Always use your most recent med list.                   Brand Name Dispense Instructions for use Diagnosis    biotin 2.5 mg/mL Susp      Take by mouth daily        COLLAGEN PO           FISH OIL PO           UNABLE TO FIND      MEDICATION NAME: Dali Fusion Women's 2 gummy's        VITAMIN D (CHOLECALCIFEROL) PO      Take by mouth daily

## 2017-09-22 NOTE — NURSING NOTE
"Chief Complaint   Patient presents with     Physical     Fasting       Initial /67 (BP Location: Left arm, Patient Position: Sitting, Cuff Size: Adult Regular)  Pulse 74  Temp 97.8  F (36.6  C) (Oral)  Ht 4' 11.45\" (1.51 m)  Wt 104 lb 9.6 oz (47.4 kg)  SpO2 97%  BMI 20.81 kg/m2 Estimated body mass index is 20.81 kg/(m^2) as calculated from the following:    Height as of this encounter: 4' 11.45\" (1.51 m).    Weight as of this encounter: 104 lb 9.6 oz (47.4 kg).  Medication Reconciliation: complete   Wen Bernardo MA      "

## 2017-09-22 NOTE — Clinical Note
Please abstract the following data from this visit with this patient into the appropriate field in Epic:  Colonoscopy done on this date: 12/27/17 (approximately), by this group: [facility], results were normal, repeat colonoscopy 10 years  Hep C screen negative 9/3/2015 at [facility]

## 2017-09-22 NOTE — PROGRESS NOTES
Injectable Influenza Immunization Documentation    1.  Is the person to be vaccinated sick today?   No    2. Does the person to be vaccinated have an allergy to a component   of the vaccine?   No    3. Has the person to be vaccinated ever had a serious reaction   to influenza vaccine in the past?   No    4. Has the person to be vaccinated ever had Guillain-Barré syndrome?   No    Form completed by Wen Bernardo MA

## 2017-09-22 NOTE — LETTER
October 5, 2017    Margaret Healy  [address] [facility] [address] [facility] [address]    Dear Margaret,  We are happy to inform you that your PAP smear result from 09/22/17 is normal.  We are now able to do a follow up test on PAP smears. The DNA test is for HPV (Human Papilloma Virus). Cervical cancer is closely linked with certain types of HPV. Your result showed no evidence of high risk HPV.  Therefore we recommend you return in 3 years for your next pap smear.  You will still need to return to the clinic every year for an annual exam and other preventive tests.  Please contact the clinic at [phone] with any questions.  Sincerely,    JAQUAN Hall CNP/esh

## 2017-09-23 ENCOUNTER — RADIANT APPOINTMENT (OUTPATIENT)
Dept: MAMMOGRAPHY | Facility: CLINIC | Age: 62
End: 2017-09-23
Payer: COMMERCIAL

## 2017-09-23 DIAGNOSIS — Z12.31 VISIT FOR SCREENING MAMMOGRAM: ICD-10-CM

## 2017-09-23 PROCEDURE — G0202 SCR MAMMO BI INCL CAD: HCPCS | Mod: TC

## 2017-09-24 LAB
C TRACH DNA SPEC QL NAA+PROBE: NEGATIVE
N GONORRHOEA DNA SPEC QL NAA+PROBE: NEGATIVE
SPECIMEN SOURCE: NORMAL
SPECIMEN SOURCE: NORMAL

## 2017-09-27 LAB
COPATH REPORT: NORMAL
PAP: NORMAL

## 2017-09-28 LAB
FINAL DIAGNOSIS: NORMAL
HPV HR 12 DNA CVX QL NAA+PROBE: NEGATIVE
HPV16 DNA SPEC QL NAA+PROBE: NEGATIVE
HPV18 DNA SPEC QL NAA+PROBE: NEGATIVE
SPECIMEN DESCRIPTION: NORMAL

## 2018-09-17 ENCOUNTER — OFFICE VISIT (OUTPATIENT)
Dept: FAMILY MEDICINE | Facility: CLINIC | Age: 63
End: 2018-09-17
Payer: COMMERCIAL

## 2018-09-17 VITALS
WEIGHT: 109.6 LBS | HEIGHT: 59 IN | OXYGEN SATURATION: 97 % | RESPIRATION RATE: 16 BRPM | DIASTOLIC BLOOD PRESSURE: 80 MMHG | TEMPERATURE: 97.9 F | HEART RATE: 70 BPM | BODY MASS INDEX: 22.09 KG/M2 | SYSTOLIC BLOOD PRESSURE: 130 MMHG

## 2018-09-17 DIAGNOSIS — Z11.4 SCREENING FOR HIV (HUMAN IMMUNODEFICIENCY VIRUS): ICD-10-CM

## 2018-09-17 DIAGNOSIS — Z12.31 ENCOUNTER FOR SCREENING MAMMOGRAM FOR BREAST CANCER: ICD-10-CM

## 2018-09-17 DIAGNOSIS — Z23 NEED FOR PROPHYLACTIC VACCINATION AND INOCULATION AGAINST INFLUENZA: ICD-10-CM

## 2018-09-17 DIAGNOSIS — Z00.00 ROUTINE HISTORY AND PHYSICAL EXAMINATION OF ADULT: Primary | ICD-10-CM

## 2018-09-17 LAB
ALBUMIN UR-MCNC: NEGATIVE MG/DL
ANION GAP SERPL CALCULATED.3IONS-SCNC: 8 MMOL/L (ref 3–14)
APPEARANCE UR: CLEAR
BILIRUB UR QL STRIP: NEGATIVE
BUN SERPL-MCNC: 13 MG/DL (ref 7–30)
CALCIUM SERPL-MCNC: 9.1 MG/DL (ref 8.5–10.1)
CHLORIDE SERPL-SCNC: 103 MMOL/L (ref 94–109)
CHOLEST SERPL-MCNC: 246 MG/DL
CO2 SERPL-SCNC: 28 MMOL/L (ref 20–32)
COLOR UR AUTO: YELLOW
CREAT SERPL-MCNC: 0.58 MG/DL (ref 0.52–1.04)
GFR SERPL CREATININE-BSD FRML MDRD: >90 ML/MIN/1.7M2
GLUCOSE SERPL-MCNC: 97 MG/DL (ref 70–99)
GLUCOSE UR STRIP-MCNC: NEGATIVE MG/DL
HDLC SERPL-MCNC: 69 MG/DL
HGB UR QL STRIP: ABNORMAL
KETONES UR STRIP-MCNC: NEGATIVE MG/DL
LDLC SERPL CALC-MCNC: 150 MG/DL
LEUKOCYTE ESTERASE UR QL STRIP: ABNORMAL
NITRATE UR QL: NEGATIVE
NONHDLC SERPL-MCNC: 177 MG/DL
PH UR STRIP: 6 PH (ref 5–7)
POTASSIUM SERPL-SCNC: 3.4 MMOL/L (ref 3.4–5.3)
RBC #/AREA URNS AUTO: NORMAL /HPF
SODIUM SERPL-SCNC: 139 MMOL/L (ref 133–144)
SOURCE: ABNORMAL
SP GR UR STRIP: <=1.005 (ref 1–1.03)
TRIGL SERPL-MCNC: 137 MG/DL
UROBILINOGEN UR STRIP-ACNC: 0.2 EU/DL (ref 0.2–1)
WBC #/AREA URNS AUTO: NORMAL /HPF

## 2018-09-17 PROCEDURE — 81001 URINALYSIS AUTO W/SCOPE: CPT | Performed by: PHYSICIAN ASSISTANT

## 2018-09-17 PROCEDURE — 90682 RIV4 VACC RECOMBINANT DNA IM: CPT | Performed by: PHYSICIAN ASSISTANT

## 2018-09-17 PROCEDURE — 99396 PREV VISIT EST AGE 40-64: CPT | Mod: 25 | Performed by: PHYSICIAN ASSISTANT

## 2018-09-17 PROCEDURE — 80061 LIPID PANEL: CPT | Performed by: PHYSICIAN ASSISTANT

## 2018-09-17 PROCEDURE — 87389 HIV-1 AG W/HIV-1&-2 AB AG IA: CPT | Performed by: PHYSICIAN ASSISTANT

## 2018-09-17 PROCEDURE — 80048 BASIC METABOLIC PNL TOTAL CA: CPT | Performed by: PHYSICIAN ASSISTANT

## 2018-09-17 PROCEDURE — 36415 COLL VENOUS BLD VENIPUNCTURE: CPT | Performed by: PHYSICIAN ASSISTANT

## 2018-09-17 PROCEDURE — 90471 IMMUNIZATION ADMIN: CPT | Performed by: PHYSICIAN ASSISTANT

## 2018-09-17 ASSESSMENT — PAIN SCALES - GENERAL: PAINLEVEL: NO PAIN (0)

## 2018-09-17 NOTE — MR AVS SNAPSHOT
After Visit Summary   9/17/2018    Margaret Healy    MRN: [id number]           Patient Information     Date Of Birth          1955        Visit Information        Provider Department      9/17/2018 8:05 AM Esther Cooley PA-C; [facility] [facility]        Today's Diagnoses     Screening for HIV (human immunodeficiency virus)    -  1    Routine history and physical examination of adult        Encounter for screening mammogram for breast cancer          Care Instructions      Preventive Health Recommendations  Female Ages 50 - 64    Yearly exam: See your health care provider every year in order to  o Review health changes.   o Discuss preventive care.    o Review your medicines if your doctor has prescribed any.      Get a Pap test every three years (unless you have an abnormal result and your provider advises testing more often).    If you get Pap tests with HPV test, you only need to test every 5 years, unless you have an abnormal result.     You do not need a Pap test if your uterus was removed (hysterectomy) and you have not had cancer.    You should be tested each year for STDs (sexually transmitted diseases) if you're at risk.     Have a mammogram every 1 to 2 years.    Have a colonoscopy at age 50, or have a yearly FIT test (stool test). These exams screen for colon cancer.      Have a cholesterol test every 5 years, or more often if advised.    Have a diabetes test (fasting glucose) every three years. If you are at risk for diabetes, you should have this test more often.     If you are at risk for osteoporosis (brittle bone disease), think about having a bone density scan (DEXA).    Shots: Get a flu shot each year. Get a tetanus shot every 10 years.    Nutrition:     Eat at least 5 servings of fruits and vegetables each day.    Eat whole-grain bread, whole-wheat pasta and brown rice instead of white grains and rice.    Get adequate Calcium and Vitamin  "D.     Lifestyle    Exercise at least 150 minutes a week (30 minutes a day, 5 days a week). This will help you control your weight and prevent disease.    Limit alcohol to one drink per day.    No smoking.     Wear sunscreen to prevent skin cancer.     See your dentist every six months for an exam and cleaning.    See your eye doctor every 1 to 2 years.            Follow-ups after your visit        Your next 10 appointments already scheduled     Oct 05, 2018  8:40 AM CDT   New Visit with Laisha Rodriguez OD   [facility] ([facility]    [address] [place] [id number]   [phone]              Future tests that were ordered for you today     Open Future Orders        Priority Expected Expires Ordered    *MA Screening Digital Bilateral Routine  9/17/2019 9/17/2018            Who to contact     If you have questions or need follow up information about today's clinic visit or your schedule please contact [facility] directly at [phone].  Normal or non-critical lab and imaging results will be communicated to you by MyChart, letter or phone within 4 business days after the clinic has received the results. If you do not hear from us within 7 days, please contact the clinic through [employer] or phone. If you have a critical or abnormal lab result, we will notify you by phone as soon as possible.  Submit refill requests through Pull or call your pharmacy and they will forward the refill request to us. Please allow 3 business days for your refill to be completed.          Additional Information About Your Visit        Pull Information     Pull lets you send messages to your doctor, view your test results, renew your prescriptions, schedule appointments and more. To sign up, go to [email or web address] . Click on \"Log in\" on the left side of the screen, which will take you to the Welcome page. Then click on \"Sign up Now\" on the " "right side of the page.     You will be asked to enter the access code listed below, as well as some personal information. Please follow the directions to create your username and password.     Your access code is: [email or web address]  Expires: 2018  8:35 AM     Your access code will  in 90 days. If you need help or a new code, please call your [place] clinic or [phone].        Care EveryWhere ID     This is your Care EveryWhere ID. This could be used by other organizations to access your [place] medical records  [id number]        Your Vitals Were     Pulse Temperature Respirations Height Pulse Oximetry BMI (Body Mass Index)    70 97.9  F (36.6  C) (Oral) 16 4' 11.25\" (1.505 m) 97% 21.95 kg/m2       Blood Pressure from Last 3 Encounters:   18 130/80   17 115/74    Weight from Last 3 Encounters:   18 109 lb 9.6 oz (49.7 kg)   17 104 lb 9.6 oz (47.4 kg)              We Performed the Following     Basic metabolic panel  (Ca, Cl, CO2, Creat, Gluc, K, Na, BUN)     HIV Screening     Lipid Profile (Chol, Trig, HDL, LDL calc)        Primary Care Provider Fax #    Physician No Ref-Primary [phone]       No address on file        Equal Access to Services     SONIA IZAGUIRRE : Hadii vivek ku hadasho Soomaali, waaxda luqadaha, qaybta kaalmada adeegyada, don bass . So [facility] [phone].    ATENCIÓN: Si habla español, tiene a arteaga disposición servicios gratuitos de asistencia lingüística. Llame al [phone].    We comply with applicable federal civil rights laws and [place] laws. We do not discriminate on the basis of race, color, national origin, age, disability, sex, sexual orientation, or gender identity.            Thank you!     Thank you for choosing [facility]  for your care. Our goal is always to provide you with excellent care. Hearing back from our patients is one way we can continue to improve our services. Please take a few " minutes to complete the written survey that you may receive in the mail after your visit with us. Thank you!             Your Updated Medication List - Protect others around you: Learn how to safely use, store and throw away your medicines at www.disposemymeds.org.          This list is accurate as of 9/17/18  8:35 AM.  Always use your most recent med list.                   Brand Name Dispense Instructions for use Diagnosis    biotin 2.5 mg/mL Susp      Take by mouth daily        COLLAGEN PO           FISH OIL PO           UNABLE TO FIND      MEDICATION NAME: Dali Fusion Women's 2 gummy's        VITAMIN D (CHOLECALCIFEROL) PO      Take by mouth daily

## 2018-09-17 NOTE — PROGRESS NOTES
SUBJECTIVE:   CC: Margaret Healy is an 63 year old woman who presents for preventive health visit.     Healthy Habits:    Do you get at least three servings of calcium containing foods daily (dairy, green leafy vegetables, etc.)? yes    Amount of exercise or daily activities, outside of work: 7 day(s) per week    Problems taking medications regularly not applicable    Medication side effects: No    Have you had an eye exam in the past two years? yes    Do you see a dentist twice per year? yes    Do you have sleep apnea, excessive snoring or daytime drowsiness?yes          Today's PHQ-2 Score:   PHQ-2 ( 1999 Pf[employer]) 9/17/2018 9/22/2017   Q1: Little interest or pleasure in doing things 0 0   Q2: Feeling down, depressed or hopeless 0 0   PHQ-2 Score 0 0       Abuse: Current or Past(Physical, Sexual or Emotional)- No  Do you feel safe in your environment - Yes    Social History   Substance Use Topics     Smoking status: Never Smoker     Smokeless tobacco: Never Used     Alcohol use No     If you drink alcohol do you typically have >3 drinks per day or >7 drinks per week? No                     Reviewed orders with patient.  Reviewed health maintenance and updated orders accordingly - Yes  BP Readings from Last 3 Encounters:   09/17/18 130/80   09/22/17 115/74    Wt Readings from Last 3 Encounters:   09/17/18 109 lb 9.6 oz (49.7 kg)   09/22/17 104 lb 9.6 oz (47.4 kg)                  There is no problem list on file for this patient.    History reviewed. No pertinent surgical history.    Social History   Substance Use Topics     Smoking status: Never Smoker     Smokeless tobacco: Never Used     Alcohol use No     Family History   Problem Relation Age of Onset     Cataracts Mother      Hypertension Mother      Glaucoma No family hx of      Macular Degeneration No family hx of          Current Outpatient Prescriptions   Medication Sig Dispense Refill     biotin 2.5 mg/mL SUSP Take by mouth daily       COLLAGEN PO         "Omega-3 Fatty Acids (FISH OIL PO)        UNABLE TO FIND MEDICATION NAME: Dali Fusion Women's 2 gummy's       VITAMIN D, CHOLECALCIFEROL, PO Take by mouth daily         Patient over age 50, mutual decision to screen reflected in health maintenance.    Pertinent mammograms are reviewed under the imaging tab.  History of abnormal Pap smear: NO - age 30-65 PAP every 5 years with negative HPV co-testing recommended  PAP / HPV Latest Ref Rng & Units 9/22/2017   PAP - NIL   HPV 16 DNA NEG:Negative Negative   HPV 18 DNA NEG:Negative Negative   OTHER HR HPV NEG:Negative Negative     Reviewed and updated as needed this visit by clinical staff  Tobacco  Allergies  Meds  Problems  Med Hx  Surg Hx  Fam Hx  Soc Hx          Reviewed and updated as needed this visit by Provider  Allergies  Meds  Problems            ROS:  CONSTITUTIONAL: NEGATIVE for fever, chills, change in weight  INTEGUMENTARY/SKIN: NEGATIVE for worrisome rashes, moles or lesions  EYES: NEGATIVE for vision changes or irritation  ENT: NEGATIVE for ear, mouth and throat problems  RESP: NEGATIVE for significant cough or SOB  BREAST: NEGATIVE for masses, tenderness or discharge  CV: NEGATIVE for chest pain, palpitations or peripheral edema  GI: NEGATIVE for nausea, abdominal pain, heartburn, or change in bowel habits  : NEGATIVE for unusual urinary or vaginal symptoms. No vaginal bleeding.  MUSCULOSKELETAL: NEGATIVE for significant arthralgias or myalgia  NEURO: NEGATIVE for weakness, dizziness or paresthesias  PSYCHIATRIC: NEGATIVE for changes in mood or affect     OBJECTIVE:   /80 (BP Location: Right arm, Patient Position: Sitting, Cuff Size: Adult Regular)  Pulse 70  Temp 97.9  F (36.6  C) (Oral)  Resp 16  Ht 4' 11.25\" (1.505 m)  Wt 109 lb 9.6 oz (49.7 kg)  SpO2 97%  BMI 21.95 kg/m2  EXAM:  GENERAL APPEARANCE: healthy, alert and no distress  EYES: Eyes grossly normal to inspection, PERRL and conjunctivae and sclerae normal  HENT: ear " canals and TM's normal, nose and mouth without ulcers or lesions, oropharynx clear and oral mucous membranes moist  NECK: no adenopathy, no asymmetry, masses, or scars and thyroid normal to palpation  RESP: lungs clear to auscultation - no rales, rhonchi or wheezes  BREAST: normal without masses, tenderness or nipple discharge and no palpable axillary masses or adenopathy  CV: regular rate and rhythm, normal S1 S2, no S3 or S4, no murmur, click or rub, no peripheral edema and peripheral pulses strong  ABDOMEN: soft, nontender, no hepatosplenomegaly, no masses and bowel sounds normal  MS: no musculoskeletal defects are noted and gait is age appropriate without ataxia  SKIN: no suspicious lesions or rashes  NEURO: Normal strength and tone, sensory exam grossly normal, mentation intact and speech normal  PSYCH: mentation appears normal and affect normal/bright    Diagnostic Test Results:  none     ASSESSMENT/PLAN:       ICD-10-CM    1. Routine history and physical examination of adult Z00.00 HIV Screening     Lipid Profile (Chol, Trig, HDL, LDL calc)     Basic metabolic panel  (Ca, Cl, CO2, Creat, Gluc, K, Na, BUN)     UA reflex to Microscopic   2. Screening for HIV (human immunodeficiency virus) Z11.4 Urine Microscopic   3. Encounter for screening mammogram for breast cancer Z12.31 *MA Screening Digital Bilateral   4. Need for prophylactic vaccination and inoculation against influenza Z23 Vaccine Administration, Initial [82217]     FLU VACCINE, (RIV4) RECOMBINANT HA  , IM (FluBlok, egg free) [74535]- >18 YRS (FMG recommended  50-64 YRS)       COUNSELING:   Reviewed preventive health counseling, as reflected in patient instructions       Regular exercise       Healthy diet/nutrition       Immunizations    Vaccinated for: Influenza             HIV screeninx in teen years, 1x in adult years, and at intervals if high risk    BP Readings from Last 1 Encounters:   18 130/80     Estimated body mass index is 21.95  "kg/(m^2) as calculated from the following:    Height as of this encounter: 4' 11.25\" (1.505 m).    Weight as of this encounter: 109 lb 9.6 oz (49.7 kg).       reports that she has never smoked. She has never used smokeless tobacco.      Counseling Resources:  ATP IV Guidelines  Pooled Cohorts Equation Calculator  Breast Cancer Risk Calculator  FRAX Risk Assessment  ICSI Preventive Guidelines  Dietary Guidelines for Americans, 2010  USDA's MyPlate  ASA Prophylaxis  Lung CA Screening    Esther Cooley PA-C  [facility]  "

## 2018-09-17 NOTE — PROGRESS NOTES

## 2018-09-18 LAB — HIV 1+2 AB+HIV1 P24 AG SERPL QL IA: NONREACTIVE

## 2018-09-28 ENCOUNTER — RADIANT APPOINTMENT (OUTPATIENT)
Dept: MAMMOGRAPHY | Facility: CLINIC | Age: 63
End: 2018-09-28
Attending: PHYSICIAN ASSISTANT
Payer: COMMERCIAL

## 2018-09-28 DIAGNOSIS — Z12.31 ENCOUNTER FOR SCREENING MAMMOGRAM FOR BREAST CANCER: ICD-10-CM

## 2018-09-28 PROCEDURE — 77067 SCR MAMMO BI INCL CAD: CPT | Mod: TC

## 2018-10-05 ENCOUNTER — OFFICE VISIT (OUTPATIENT)
Dept: OPTOMETRY | Facility: CLINIC | Age: 63
End: 2018-10-05
Payer: COMMERCIAL

## 2018-10-05 DIAGNOSIS — H02.889 MEIBOMIAN GLAND DISEASE, UNSPECIFIED LATERALITY: ICD-10-CM

## 2018-10-05 DIAGNOSIS — H02.831 DERMATOCHALASIS OF BOTH UPPER EYELIDS: ICD-10-CM

## 2018-10-05 DIAGNOSIS — H25.813 COMBINED FORMS OF AGE-RELATED CATARACT OF BOTH EYES: ICD-10-CM

## 2018-10-05 DIAGNOSIS — H52.203 HYPEROPIA OF BOTH EYES WITH ASTIGMATISM AND PRESBYOPIA: Primary | ICD-10-CM

## 2018-10-05 DIAGNOSIS — H52.03 HYPEROPIA OF BOTH EYES WITH ASTIGMATISM AND PRESBYOPIA: Primary | ICD-10-CM

## 2018-10-05 DIAGNOSIS — H52.4 HYPEROPIA OF BOTH EYES WITH ASTIGMATISM AND PRESBYOPIA: Primary | ICD-10-CM

## 2018-10-05 DIAGNOSIS — H02.834 DERMATOCHALASIS OF BOTH UPPER EYELIDS: ICD-10-CM

## 2018-10-05 PROCEDURE — 92014 COMPRE OPH EXAM EST PT 1/>: CPT | Performed by: OPTOMETRIST

## 2018-10-05 PROCEDURE — 92015 DETERMINE REFRACTIVE STATE: CPT | Performed by: OPTOMETRIST

## 2018-10-05 ASSESSMENT — REFRACTION_MANIFEST
OS_CYLINDER: +1.50
OD_ADD: +2.50
OS_AXIS: 006
OS_SPHERE: +1.00
OD_SPHERE: +1.50
OS_CYLINDER: +1.00
OS_AXIS: 180
OD_AXIS: 005
OD_CYLINDER: +1.50
OD_AXIS: 011
OS_ADD: +2.50
OS_SPHERE: +1.50
METHOD_AUTOREFRACTION: 1
OD_CYLINDER: +1.25
OD_SPHERE: +1.25

## 2018-10-05 ASSESSMENT — CONF VISUAL FIELD
OS_NORMAL: 1
OD_NORMAL: 1
METHOD: COUNTING FINGERS

## 2018-10-05 ASSESSMENT — EXTERNAL EXAM - RIGHT EYE: OD_EXAM: NORMAL

## 2018-10-05 ASSESSMENT — TONOMETRY
OS_IOP_MMHG: 12
IOP_METHOD: APPLANATION
OD_IOP_MMHG: 13

## 2018-10-05 ASSESSMENT — REFRACTION_WEARINGRX
OS_ADD: +2.50
OS_CYLINDER: +1.25
OD_CYLINDER: +1.50
OD_ADD: +2.50
SPECS_TYPE: BIFOCAL
OS_AXIS: 001
OS_SPHERE: +1.00
OD_AXIS: 007
OD_SPHERE: +1.50

## 2018-10-05 ASSESSMENT — VISUAL ACUITY
OD_CC+: -1
CORRECTION_TYPE: GLASSES
OD_CC: 20/40
OS_SC+: +2
OS_CC: 20/20
OD_CC: 20/25
OS_CC: 20/40
METHOD: SNELLEN - LINEAR
OS_SC: 20/60
OD_SC: 20/40

## 2018-10-05 ASSESSMENT — CUP TO DISC RATIO
OS_RATIO: 0.35
OD_RATIO: 0.3

## 2018-10-05 ASSESSMENT — EXTERNAL EXAM - LEFT EYE: OS_EXAM: NORMAL

## 2018-10-05 NOTE — LETTER
10/5/2018         RE: Margaret Healy  [address] [facility] [address]        Dear Colleague,    Thank you for referring your patient, Margaret Healy, to the [facility]. Please see a copy of my visit note below.    Chief Complaint   Patient presents with     COMPREHENSIVE EYE EXAM      Accompanied by   Last Eye Exam: 9/2017  Dilated Previously: Yes    What are you currently using to see?  Glasses for reading       Distance Vision Acuity: Satisfied with vision    Near Vision Acuity: Not satisfied     Eye Comfort: strain while reading  Do you use eye drops? : No  Occupation or Hobbies: [employer]    Serina Brito  Optometric Tech            Medical, surgical and family histories reviewed and updated 10/5/2018.       Would like a referral to Vick Sotomayor MD  [facility] [place] [address]    For eyelid surgery.    OBJECTIVE: See Ophthalmology exam    ASSESSMENT:    ICD-10-CM    1. Hyperopia of both eyes with astigmatism and presbyopia H52.03 EYE EXAM (SIMPLE-NONBILLABLE)    H52.203 REFRACTION    H52.4    2. Combined forms of age-related cataract of both eyes H25.813 EYE EXAM (SIMPLE-NONBILLABLE)   3. Meibomian gland disease, unspecified laterality H01.009 EYE EXAM (SIMPLE-NONBILLABLE)   4. Dermatochalasis of both upper eyelids H02.831 EYE EXAM (SIMPLE-NONBILLABLE)    H02.834       PLAN:     Patient Instructions   There was very little change in the prescription for your glasses.    For the burning try and artifical tear such as Refresh or Soothe XP 3 to 4 times a day along with warm compresses for 10 minutes once a day. Also fish oil capsules 1000 mg to 2000 mg daily.    Your eyes may be blurry at near and sensitive to light for several hours from the dilating drops.    Mild cataracts both eyes. Wear sunglasses and monitor yearly.         Again, thank you for allowing me to participate in the care of your patient.        Sincerely,        Laisha Rodriguez OD

## 2018-10-05 NOTE — PROGRESS NOTES
Chief Complaint   Patient presents with     COMPREHENSIVE EYE EXAM      Accompanied by   Last Eye Exam: 9/2017  Dilated Previously: Yes    What are you currently using to see?  Glasses for reading       Distance Vision Acuity: Satisfied with vision    Near Vision Acuity: Not satisfied     Eye Comfort: strain while reading  Do you use eye drops? : No  Occupation or Hobbies: [employer]    Serina Brito  Optometric Tech            Medical, surgical and family histories reviewed and updated 10/5/2018.       Would like a referral to Vick Sotomayor MD  [address] [place] [id number]    For eyelid surgery.    OBJECTIVE: See Ophthalmology exam    ASSESSMENT:    ICD-10-CM    1. Hyperopia of both eyes with astigmatism and presbyopia H52.03 EYE EXAM (SIMPLE-NONBILLABLE)    H52.203 REFRACTION    H52.4    2. Combined forms of age-related cataract of both eyes H25.813 EYE EXAM (SIMPLE-NONBILLABLE)   3. Meibomian gland disease, unspecified laterality H01.009 EYE EXAM (SIMPLE-NONBILLABLE)   4. Dermatochalasis of both upper eyelids H02.831 EYE EXAM (SIMPLE-NONBILLABLE)    H02.834       PLAN:     Patient Instructions   There was very little change in the prescription for your glasses.    For the burning try and artifical tear such as Refresh or Soothe XP 3 to 4 times a day along with warm compresses for 10 minutes once a day. Also fish oil capsules 1000 mg to 2000 mg daily.    Your eyes may be blurry at near and sensitive to light for several hours from the dilating drops.    Mild cataracts both eyes. Wear sunglasses and monitor yearly.

## 2018-10-05 NOTE — MR AVS SNAPSHOT
"              After Visit Summary   10/5/2018    Margaret Healy    MRN: [id number]           Patient Information     Date Of Birth          1955        Visit Information        Provider Department      10/5/2018 8:30 AM Laisha Rodriguez OD; [employer] [facility]        Care Instructions    There was very little change in the prescription for your glasses.    For the burning try and artifical tear such as Refresh or Soothe XP 3 to 4 times a day along with warm compresses for 10 minutes once a day. Also fish oil capsules 1000 mg to 2000 mg daily.    Your eyes may be blurry at near and sensitive to light for several hours from the dilating drops.    Mild cataracts both eyes. Wear sunglasses and monitor yearly.          Follow-ups after your visit        Who to contact     If you have questions or need follow up information about today's clinic visit or your schedule please contact [facility] directly at [phone].  Normal or non-critical lab and imaging results will be communicated to you by iCADhart, letter or phone within 4 business days after the clinic has received the results. If you do not hear from us within 7 days, please contact the clinic through [email or web address] or phone. If you have a critical or abnormal lab result, we will notify you by phone as soon as possible.  Submit refill requests through Hands or call your pharmacy and they will forward the refill request to us. Please allow 3 business days for your refill to be completed.          Additional Information About Your Visit        iCADhart Information     Hands lets you send messages to your doctor, view your test results, renew your prescriptions, schedule appointments and more. To sign up, go to [email or web address] . Click on \"Log in\" on the left side of the screen, which will take you to the Welcome page. Then click on \"Sign up Now\" on the right side of the page.     You will be " asked to enter the access code listed below, as well as some personal information. Please follow the directions to create your username and password.     Your access code is: [email or web address]  Expires: 2018  8:35 AM     Your access code will  in 90 days. If you need help or a new code, please call your [facility] clinic or [phone].        Care EveryWhere ID     This is your Care EveryWhere ID. This could be used by other organizations to access your [facility] medical records  [id number]         Blood Pressure from Last 3 Encounters:   18 130/80   17 115/74    Weight from Last 3 Encounters:   18 49.7 kg (109 lb 9.6 oz)   17 47.4 kg (104 lb 9.6 oz)              Today, you had the following     No orders found for display       Primary Care Provider Fax #    Physician No Ref-Primary [phone]       No address on file        Equal Access to Services     SONIA IZAGUIRRE : Hadii vivek alberts hadasho Soomaali, waaxda luqadaha, qaybta kaalmada adeegyada, don bass . So [facility] [phone].    ATENCIÓN: Si habla español, tiene a arteaga disposición servicios gratuitos de asistencia lingüística. Llame al [phone].    We comply with applicable federal civil rights laws and [place] laws. We do not discriminate on the basis of race, color, national origin, age, disability, sex, sexual orientation, or gender identity.            Thank you!     Thank you for choosing [facility]  for your care. Our goal is always to provide you with excellent care. Hearing back from our patients is one way we can continue to improve our services. Please take a few minutes to complete the written survey that you may receive in the mail after your visit with us. Thank you!             Your Updated Medication List - Protect others around you: Learn how to safely use, store and throw away your medicines at www.disposemymeds.org.          This list is accurate as of 10/5/18   9:46 AM.  Always use your most recent med list.                   Brand Name Dispense Instructions for use Diagnosis    biotin 2.5 mg/mL Susp      Take by mouth daily        COLLAGEN PO           FISH OIL PO           UNABLE TO FIND      MEDICATION NAME: Dali Fusion Women's 2 gummy's        VITAMIN D (CHOLECALCIFEROL) PO      Take by mouth daily

## 2018-10-05 NOTE — PATIENT INSTRUCTIONS
There was very little change in the prescription for your glasses.    For the burning try and artifical tear such as Refresh or Soothe XP 3 to 4 times a day along with warm compresses for 10 minutes once a day. Also fish oil capsules 1000 mg to 2000 mg daily.    Your eyes may be blurry at near and sensitive to light for several hours from the dilating drops.    Mild cataracts both eyes. Wear sunglasses and monitor yearly.

## 2019-05-14 ENCOUNTER — OFFICE VISIT (OUTPATIENT)
Dept: FAMILY MEDICINE | Facility: CLINIC | Age: 64
End: 2019-05-14
Payer: COMMERCIAL

## 2019-05-14 VITALS
OXYGEN SATURATION: 98 % | SYSTOLIC BLOOD PRESSURE: 124 MMHG | RESPIRATION RATE: 16 BRPM | WEIGHT: 108 LBS | TEMPERATURE: 97.6 F | DIASTOLIC BLOOD PRESSURE: 71 MMHG | HEIGHT: 59 IN | HEART RATE: 78 BPM | BODY MASS INDEX: 21.77 KG/M2

## 2019-05-14 DIAGNOSIS — K64.4 EXTERNAL HEMORRHOIDS: ICD-10-CM

## 2019-05-14 DIAGNOSIS — R05.9 COUGH: Primary | ICD-10-CM

## 2019-05-14 PROCEDURE — 99213 OFFICE O/P EST LOW 20 MIN: CPT | Performed by: NURSE PRACTITIONER

## 2019-05-14 RX ORDER — FLUTICASONE PROPIONATE 50 MCG
2 SPRAY, SUSPENSION (ML) NASAL DAILY
Qty: 18.2 ML | Refills: 1 | Status: SHIPPED | OUTPATIENT
Start: 2019-05-14 | End: 2019-10-21

## 2019-05-14 RX ORDER — BENZONATATE 200 MG/1
200 CAPSULE ORAL 3 TIMES DAILY PRN
Qty: 21 CAPSULE | Refills: 0 | Status: SHIPPED | OUTPATIENT
Start: 2019-05-14 | End: 2019-10-21

## 2019-05-14 ASSESSMENT — MIFFLIN-ST. JEOR: SCORE: 954.47

## 2019-05-14 NOTE — PROGRESS NOTES
SUBJECTIVE:   Margaret Healy is a 63 year old female who presents to clinic today for the following   health issues:      Acute Illness   Acute illness concerns: cough   Onset: 10 days     Fever: no    Chills/Sweats: no    Headache (location?): YES    Sinus Pressure:no    Conjunctivitis:  no    Ear Pain: no    Rhinorrhea: no    Congestion: no    Sore Throat: no     Cough: YES-productive of clear sputum    Wheeze: no    Decreased Appetite: YES    Nausea: no    Vomiting: no    Diarrhea:  no    Dysuria/Freq.: no    Fatigue/Achiness: no    Sick/Strep Exposure: YES- father      Therapies Tried and outcome: cough drops    No shortness of breath   Chest pain with cough  Cough worse at night when laying down  Symptoms improving  Denies history of allergies          Additional history: as documented    Reviewed  and updated as needed this visit by clinical staff  Tobacco  Allergies  Meds  Problems  Med Hx  Surg Hx  Fam Hx  Soc Hx          Reviewed and updated as needed this visit by Provider  Tobacco  Allergies  Meds  Problems  Med Hx  Surg Hx  Fam Hx         Patient Active Problem List   Diagnosis     Combined forms of age-related cataract of both eyes     Meibomian gland disease, unspecified laterality     History reviewed. No pertinent surgical history.    Social History     Tobacco Use     Smoking status: Never Smoker     Smokeless tobacco: Never Used   Substance Use Topics     Alcohol use: No     Family History   Problem Relation Age of Onset     Cataracts Mother      Hypertension Mother      Glaucoma No family hx of      Macular Degeneration No family hx of          Current Outpatient Medications   Medication Sig Dispense Refill     benzonatate (TESSALON) 200 MG capsule Take 1 capsule (200 mg) by mouth 3 times daily as needed for cough 21 capsule 0     biotin 2.5 mg/mL SUSP Take by mouth daily       COLLAGEN PO        fluticasone (FLONASE) 50 MCG/ACT nasal spray Spray 2 sprays into both nostrils daily  "18.2 mL 1     hydrocortisone (ANUSOL-HC) 2.5 % cream Place rectally 2 times daily as needed for hemorrhoids 30 g 1     Omega-3 Fatty Acids (FISH OIL PO)        UNABLE TO FIND MEDICATION NAME: Dali Fusion Women's 2 gummy's       VITAMIN D, CHOLECALCIFEROL, PO Take by mouth daily       Allergies   Allergen Reactions     B1-Ca      BP Readings from Last 3 Encounters:   05/14/19 124/71   09/17/18 130/80   09/22/17 115/74    Wt Readings from Last 3 Encounters:   05/14/19 49 kg (108 lb)   09/17/18 49.7 kg (109 lb 9.6 oz)   09/22/17 47.4 kg (104 lb 9.6 oz)                    ROS:  Constitutional, HEENT, cardiovascular, pulmonary, gi and gu systems are negative, except as otherwise noted.    OBJECTIVE:     /71 (BP Location: Left arm, Patient Position: Chair, Cuff Size: Adult Regular)   Pulse 78   Temp 97.6  F (36.4  C) (Oral)   Resp 16   Ht 1.505 m (4' 11.25\")   Wt 49 kg (108 lb)   SpO2 98%   BMI 21.63 kg/m    Body mass index is 21.63 kg/m .  GENERAL: healthy, alert and no distress  EYES: Eyes grossly normal to inspection, PERRL and conjunctivae and sclerae normal  HENT: normal cephalic/atraumatic, ear canals and TM's normal, nose and mouth without ulcers or lesions, nasal mucosa edematous , rhinorrhea clear, oropharynx clear, oral mucous membranes moist, tonsillar erythema and sinuses: not tender  NECK: no adenopathy, no asymmetry, masses, or scars and thyroid normal to palpation  RESP: lungs clear to auscultation - no rales, rhonchi or wheezes  CV: regular rate and rhythm, normal S1 S2, no S3 or S4, no murmur, click or rub, no peripheral edema and peripheral pulses strong  ABDOMEN: soft, nontender, no hepatosplenomegaly, no masses and bowel sounds normal  MS: no gross musculoskeletal defects noted, no edema    Diagnostic Test Results:  none     ASSESSMENT/PLAN:     1. Cough  Likely related to postnasal drip.  Treatment as below.    - fluticasone (FLONASE) 50 MCG/ACT nasal spray; Spray 2 sprays into both " nostrils daily  Dispense: 18.2 mL; Refill: 1  - benzonatate (TESSALON) 200 MG capsule; Take 1 capsule (200 mg) by mouth 3 times daily as needed for cough  Dispense: 21 capsule; Refill: 0    2. External hemorrhoids  Prescribe for below.  Uses intermittently.  Discussed bowel regimen.    - hydrocortisone (ANUSOL-HC) 2.5 % cream; Place rectally 2 times daily as needed for hemorrhoids  Dispense: 30 g; Refill: 1    Patient Instructions   For your symptoms:  -start flonase nasal spray to help with inflammation  -take tessalon (benzonatate) three times a day to help with cough (nondrowsy)  -please drink a lot of fluid- water is best- 8-10 glasses a day.   -rest as much as possible  -Gargle with warm salt water a few times a day to relieve a sore throat  Throat sprays or lozenges may also help relieve the pain  -Use saline (salt water) nose drops to help loosen mucus and moisten the tender skin in your nose  Stop smoking and avoid secondhand smoke and avoid alcohol. .  -Saline spray can be helpful as well to clear your nasal passages of irritating allergens  -Ibuprofen or Tylenol for fever/body aches  -I recommend avoiding nasal spray like Afrin as this can cause severe rebound congestion            JAQUAN Shelley [facility]

## 2019-05-14 NOTE — PATIENT INSTRUCTIONS
For your symptoms:  -start flonase nasal spray to help with inflammation  -take tessalon (benzonatate) three times a day to help with cough (nondrowsy)  -please drink a lot of fluid- water is best- 8-10 glasses a day.   -rest as much as possible  -Gargle with warm salt water a few times a day to relieve a sore throat  Throat sprays or lozenges may also help relieve the pain  -Use saline (salt water) nose drops to help loosen mucus and moisten the tender skin in your nose  Stop smoking and avoid secondhand smoke and avoid alcohol. .  -Saline spray can be helpful as well to clear your nasal passages of irritating allergens  -Ibuprofen or Tylenol for fever/body aches  -I recommend avoiding nasal spray like Afrin as this can cause severe rebound congestion

## 2019-09-18 ENCOUNTER — OFFICE VISIT (OUTPATIENT)
Dept: FAMILY MEDICINE | Facility: CLINIC | Age: 64
End: 2019-09-18
Payer: COMMERCIAL

## 2019-09-18 ENCOUNTER — TELEPHONE (OUTPATIENT)
Dept: FAMILY MEDICINE | Facility: CLINIC | Age: 64
End: 2019-09-18

## 2019-09-18 VITALS
RESPIRATION RATE: 16 BRPM | TEMPERATURE: 97.6 F | BODY MASS INDEX: 22.3 KG/M2 | HEART RATE: 77 BPM | DIASTOLIC BLOOD PRESSURE: 80 MMHG | HEIGHT: 59 IN | SYSTOLIC BLOOD PRESSURE: 138 MMHG | WEIGHT: 110.6 LBS | OXYGEN SATURATION: 97 %

## 2019-09-18 DIAGNOSIS — Z00.00 ROUTINE GENERAL MEDICAL EXAMINATION AT A HEALTH CARE FACILITY: Primary | ICD-10-CM

## 2019-09-18 DIAGNOSIS — E78.2 MIXED HYPERLIPIDEMIA: Primary | ICD-10-CM

## 2019-09-18 DIAGNOSIS — R73.01 IMPAIRED FASTING GLUCOSE: ICD-10-CM

## 2019-09-18 DIAGNOSIS — K64.4 EXTERNAL HEMORRHOIDS: ICD-10-CM

## 2019-09-18 DIAGNOSIS — Z12.31 ENCOUNTER FOR SCREENING MAMMOGRAM FOR BREAST CANCER: ICD-10-CM

## 2019-09-18 LAB
CHOLEST SERPL-MCNC: 277 MG/DL
GLUCOSE SERPL-MCNC: 102 MG/DL (ref 70–99)
HDLC SERPL-MCNC: 80 MG/DL
LDLC SERPL CALC-MCNC: 177 MG/DL
NONHDLC SERPL-MCNC: 197 MG/DL
TRIGL SERPL-MCNC: 98 MG/DL

## 2019-09-18 PROCEDURE — 82947 ASSAY GLUCOSE BLOOD QUANT: CPT | Performed by: PREVENTIVE MEDICINE

## 2019-09-18 PROCEDURE — 90686 IIV4 VACC NO PRSV 0.5 ML IM: CPT | Performed by: PREVENTIVE MEDICINE

## 2019-09-18 PROCEDURE — 90471 IMMUNIZATION ADMIN: CPT | Performed by: PREVENTIVE MEDICINE

## 2019-09-18 PROCEDURE — 80061 LIPID PANEL: CPT | Performed by: PREVENTIVE MEDICINE

## 2019-09-18 PROCEDURE — 99396 PREV VISIT EST AGE 40-64: CPT | Mod: 25 | Performed by: PREVENTIVE MEDICINE

## 2019-09-18 PROCEDURE — 36415 COLL VENOUS BLD VENIPUNCTURE: CPT | Performed by: PREVENTIVE MEDICINE

## 2019-09-18 RX ORDER — ATORVASTATIN CALCIUM 20 MG/1
20 TABLET, FILM COATED ORAL DAILY
Qty: 90 TABLET | Refills: 3 | Status: SHIPPED | OUTPATIENT
Start: 2019-09-18 | End: 2020-10-26

## 2019-09-18 ASSESSMENT — PAIN SCALES - GENERAL: PAINLEVEL: NO PAIN (0)

## 2019-09-18 ASSESSMENT — MIFFLIN-ST. JEOR: SCORE: 961.27

## 2019-09-18 NOTE — PATIENT INSTRUCTIONS
Preventive Health Recommendations  Female Ages 50 - 64    Yearly exam: See your health care provider every year in order to  o Review health changes.   o Discuss preventive care.    o Review your medicines if your doctor has prescribed any.      Get a Pap test every three years (unless you have an abnormal result and your provider advises testing more often).    If you get Pap tests with HPV test, you only need to test every 5 years, unless you have an abnormal result.     You do not need a Pap test if your uterus was removed (hysterectomy) and you have not had cancer.    You should be tested each year for STDs (sexually transmitted diseases) if you're at risk.     Have a mammogram every 1 to 2 years.    Have a colonoscopy at age 50, or have a yearly FIT test (stool test). These exams screen for colon cancer.      Have a cholesterol test every 5 years, or more often if advised.    Have a diabetes test (fasting glucose) every three years. If you are at risk for diabetes, you should have this test more often.     If you are at risk for osteoporosis (brittle bone disease), think about having a bone density scan (DEXA).    Shots: Get a flu shot each year. Get a tetanus shot every 10 years.    Nutrition:     Eat at least 5 servings of fruits and vegetables each day.    Eat whole-grain bread, whole-wheat pasta and brown rice instead of white grains and rice.    Get adequate Calcium and Vitamin D.     Lifestyle    Exercise at least 150 minutes a week (30 minutes a day, 5 days a week). This will help you control your weight and prevent disease.    Limit alcohol to one drink per day.    No smoking.     Wear sunscreen to prevent skin cancer.     See your dentist every six months for an exam and cleaning.    See your eye doctor every 1 to 2 years.    At [facility], we strive to deliver an exceptional experience to you, every time we see you.  If you receive a survey in the mail, please send us back your  thoughts. We really do value your feedback.    Based on your medical history, these are the current health maintenance/preventive care services that you are due for (some may have been done at this visit.)  Health Maintenance Due   Topic Date Due     ADVANCE CARE PLANNING  1955     ZOSTER IMMUNIZATION (1 of 2) 08/07/2005     PHQ-2  01/01/2019     INFLUENZA VACCINE (1) 09/01/2019     PREVENTIVE CARE VISIT  09/17/2019         Suggested websites for health information:  Www.lark.org : Up to date and easily searchable information on multiple topics.  Www.medlineplus.gov : medication info, interactive tutorials, watch real surgeries online  Www.familydoctor.org : good info from the Academy of Family Physicians  Www.cdc.gov : public health info, travel advisories, epidemics (H1N1)  Www.aap.org : children's health info, normal development, vaccinations  Www.health.[facility].[place].us : [place] dept of health, public health issues in [place], N1N1    Your care team:                            Family Medicine Internal Medicine   MD David Badillo MD Shantel Branch-Fleming, MD Katya Georgiev PA-C Nam Ho, MD Pediatrics   JENNYFER Baum, BOOKER Hooker APRN CNP   MD Lori Pena MD Deborah Mielke, MD Kim Thein, APRN CNP      Clinic hours: Monday - Thursday 7 am-7 pm; Fridays 7 am-5 pm.   Urgent care: Monday - Friday 11 am-9 pm; Saturday and Sunday 9 am-5 pm.  Pharmacy : Monday -Thursday 8 am-8 pm; Friday 8 am-6 pm; Saturday and Sunday 9 am-5 pm.     Clinic: [phone]   Pharmacy: [phone]

## 2019-09-18 NOTE — PROGRESS NOTES
SUBJECTIVE:   CC: Margaret Healy is an 64 year old woman who presents for preventive health visit.     Healthy Habits:    Do you get at least three servings of calcium containing foods daily (dairy, green leafy vegetables, etc.)? yes    Amount of exercise or daily activities, outside of work: 6 day(s) per week    Problems taking medications regularly No    Medication side effects: No    Have you had an eye exam in the past two years? yes    Do you see a dentist twice per year? yes    Do you have sleep apnea, excessive snoring or daytime drowsiness?no      Today's PHQ-2 Score:   PHQ-2 ( 1999 Pfizer) 9/18/2019 9/17/2018   Q1: Little interest or pleasure in doing things 0 0   Q2: Feeling down, depressed or hopeless 0 0   PHQ-2 Score 0 0       Abuse: Current or Past(Physical, Sexual or Emotional)- No  Do you feel safe in your environment? Yes    Social History     Tobacco Use     Smoking status: Never Smoker     Smokeless tobacco: Never Used   Substance Use Topics     Alcohol use: No     If you drink alcohol do you typically have >3 drinks per day or >7 drinks per week? No                     Reviewed orders with patient.  Reviewed health maintenance and updated orders accordingly - Yes  Lab work is in process  Labs reviewed in EPIC  BP Readings from Last 3 Encounters:   09/18/19 138/80   05/14/19 124/71   09/17/18 130/80    Wt Readings from Last 3 Encounters:   09/18/19 50.2 kg (110 lb 9.6 oz)   05/14/19 49 kg (108 lb)   09/17/18 49.7 kg (109 lb 9.6 oz)                  Patient Active Problem List   Diagnosis     Combined forms of age-related cataract of both eyes     Meibomian gland disease, unspecified laterality     History reviewed. No pertinent surgical history.    Social History     Tobacco Use     Smoking status: Never Smoker     Smokeless tobacco: Never Used   Substance Use Topics     Alcohol use: No     Family History   Problem Relation Age of Onset     Cataracts Mother      Hypertension Mother      Glaucoma  No family hx of      Macular Degeneration No family hx of          Current Outpatient Medications   Medication Sig Dispense Refill     benzonatate (TESSALON) 200 MG capsule Take 1 capsule (200 mg) by mouth 3 times daily as needed for cough 21 capsule 0     biotin 2.5 mg/mL SUSP Take by mouth daily       COLLAGEN PO        fluticasone (FLONASE) 50 MCG/ACT nasal spray Spray 2 sprays into both nostrils daily 18.2 mL 1     hydrocortisone (ANUSOL-HC) 2.5 % cream Place rectally 2 times daily as needed for hemorrhoids 30 g 1     Omega-3 Fatty Acids (FISH OIL PO)        UNABLE TO FIND MEDICATION NAME: Dali Fusion Women's 2 gummy's       VITAMIN D, CHOLECALCIFEROL, PO Take by mouth daily       Allergies   Allergen Reactions     B1-Ca      No Clinical Screening - See Comments Other (See Comments)     Vitamin B1 - feels like she is going to pass out when taking this medication       Mammogram Screening: Patient over age 50, mutual decision to screen reflected in health maintenance.    Pertinent mammograms are reviewed under the imaging tab.  History of abnormal Pap smear: NO - age 30-65 PAP every 5 years with negative HPV co-testing recommended  PAP / HPV Latest Ref Rng & Units 9/22/2017   PAP - NIL   HPV 16 DNA NEG:Negative Negative   HPV 18 DNA NEG:Negative Negative   OTHER HR HPV NEG:Negative Negative     Reviewed and updated as needed this visit by clinical staff  Tobacco  Allergies  Meds  Problems  Med Hx  Surg Hx  Fam Hx  Soc Hx          Reviewed and updated as needed this visit by Provider  Tobacco  Allergies  Meds  Problems  Med Hx  Surg Hx  Fam Hx        History reviewed. No pertinent past medical history.   History reviewed. No pertinent surgical history.    ROS:  CONSTITUTIONAL: NEGATIVE for fever, chills, change in weight  INTEGUMENTARY/SKIN: NEGATIVE for worrisome rashes, moles or lesions  EYES: NEGATIVE for vision changes or irritation  ENT: NEGATIVE for ear, mouth and throat problems  RESP:  "NEGATIVE for significant cough or SOB  BREAST: NEGATIVE for masses, tenderness or discharge  CV: NEGATIVE for chest pain, palpitations or peripheral edema  GI: NEGATIVE for nausea, abdominal pain, heartburn, or change in bowel habits  : NEGATIVE for unusual urinary or vaginal symptoms. No vaginal bleeding.  MUSCULOSKELETAL: NEGATIVE for significant arthralgias or myalgia  ENDOCRINE: NEGATIVE for temperature intolerance, skin/hair changes  HEME/ALLERGY/IMMUNE: NEGATIVE for bleeding problems  PSYCHIATRIC: NEGATIVE for changes in mood or affect     OBJECTIVE:   /80 (BP Location: Left arm, Patient Position: Sitting, Cuff Size: Adult Regular)   Pulse 77   Temp 97.6  F (36.4  C) (Oral)   Resp 16   Ht 1.505 m (4' 11.25\")   Wt 50.2 kg (110 lb 9.6 oz)   LMP  (LMP Unknown)   SpO2 97%   Breastfeeding? No   BMI 22.15 kg/m    EXAM:  GENERAL APPEARANCE: healthy, alert and no distress  EYES: Eyes grossly normal to inspection, PERRL and conjunctivae and sclerae normal  HENT: ear canals and TM's normal, nose and mouth without ulcers or lesions, oropharynx clear and oral mucous membranes moist  NECK: no adenopathy, no asymmetry, masses  RESP: lungs clear to auscultation - no rales, rhonchi or wheezes  BREAST: normal without masses, tenderness or nipple discharge and no palpable axillary masses or adenopathy  CV: regular rate and rhythm, normal S1 S2, no S3 or S4, no murmur, click or rub, no peripheral edema and peripheral pulses strong  ABDOMEN: soft, nontender, no hepatosplenomegaly, no masses and bowel sounds normal  MS: no musculoskeletal defects are noted and gait is age appropriate without ataxia  SKIN: no suspicious lesions or rashes  NEURO: Normal strength and tone, sensory exam grossly normal, mentation intact and speech normal  PSYCH: mentation appears normal and affect normal/bright    Diagnostic Test Results:  Labs reviewed in Epic  No results found for this or any previous visit (from the past 24 " "hour(s)).    ASSESSMENT/PLAN:   Margaret was seen today for physical.    Diagnoses and all orders for this visit:    Routine general medical examination at a health care facility  -     Lipid panel reflex to direct LDL Fasting  -     Glucose    Encounter for screening mammogram for breast cancer  -     *MA Screening Digital Bilateral; Future    External hemorrhoids  -     hydrocortisone (ANUSOL-HC) 2.5 % cream; Place rectally 2 times daily as needed for hemorrhoids  -refill on medication provided   -colonoscopy done 12/2016    Other orders  -     HC FLU VAC PRESRV FREE QUAD SPLIT VIR 3+YRS IM  [[id number]]        COUNSELING:   Reviewed preventive health counseling, as reflected in patient instructions       Regular exercise       Healthy diet/nutrition       Vision screening       Immunizations    Vaccinated for: Influenza          Estimated body mass index is 22.15 kg/m  as calculated from the following:    Height as of this encounter: 1.505 m (4' 11.25\").    Weight as of this encounter: 50.2 kg (110 lb 9.6 oz).         reports that she has never smoked. She has never used smokeless tobacco.      Counseling Resources:  ATP IV Guidelines  Pooled Cohorts Equation Calculator  Breast Cancer Risk Calculator  FRAX Risk Assessment  ICSI Preventive Guidelines  Dietary Guidelines for Americans, 2010  USDA's MyPlate  ASA Prophylaxis  Lung CA Screening    Nila Montalvo MD MPH    [facility]  "

## 2019-09-18 NOTE — RESULT ENCOUNTER NOTE
Please CALL patient:    Dear Margaret CASTILLO Healy,    Cholesterol has increased since last check. I would recommend starting medication for this, sent to pharmacy.  Glucose is in the impaired range, this means that you do not have diabetes but are at an increased risk of developing diabetes in the future.   Here are some ways to improve your glucose without medication:    Try to get at least 45 minutes of aerobic exercise 5-6 days a week  Maintain a healthy body weight  Eat less saturated fats  Buy lean cuts of meat, reduce your portions of red meat or substitute poultry or fish  Avoid fried or fast foods that are high in fat  Eat more fruits and vegetables    We should recheck fasting labs in 6 months, orders are in the system.     Regards,    Nila Montalvo MD MPH

## 2019-09-18 NOTE — TELEPHONE ENCOUNTER
[place]  Mimi, [id number]  This writer attempted to contact patient on 09/18/19      Reason for call results and left message.      If patient calls back:   Registered Nurse called. Follow Triage Call workflow        Corinna Khan, JEISON          Notes recorded by Nila Montalvo MD on 9/18/2019 at 3:07 PM CDT  Please CALL patient:    Dear Margaret Healy,    Cholesterol has increased since last check. I would recommend starting medication for this, sent to pharmacy.  Glucose is in the impaired range, this means that you do not have diabetes but are at an increased risk of developing diabetes in the future.   Here are some ways to improve your glucose without medication:    Try to get at least 45 minutes of aerobic exercise 5-6 days a week  Maintain a healthy body weight  Eat less saturated fats  Buy lean cuts of meat, reduce your portions of red meat or substitute poultry or fish  Avoid fried or fast foods that are high in fat  Eat more fruits and vegetables    We should recheck fasting labs in 6 months, orders are in the system.     Regards,    Nila Montalvo MD MPH

## 2019-09-19 NOTE — TELEPHONE ENCOUNTER
This writer attempted to contact [facility] on 09/19/19 via Spanish  ID#[id number]      Reason for call results and left message.      If patient calls back:   Registered Nurse called. Follow Triage Call workflow        Donna Rosario RN

## 2019-09-19 NOTE — TELEPHONE ENCOUNTER
Reviewed results with patient via  #[id number]    Told her where medications were sent      Torrie Rosen RN

## 2019-09-19 NOTE — TELEPHONE ENCOUNTER
pa returned call    Best number to reach caller: Home number on file [phone] (home)    Is it ok to leave a detailed message: YES

## 2019-10-01 ENCOUNTER — ANCILLARY PROCEDURE (OUTPATIENT)
Dept: MAMMOGRAPHY | Facility: CLINIC | Age: 64
End: 2019-10-01
Attending: PREVENTIVE MEDICINE
Payer: COMMERCIAL

## 2019-10-01 DIAGNOSIS — Z12.31 ENCOUNTER FOR SCREENING MAMMOGRAM FOR BREAST CANCER: ICD-10-CM

## 2019-10-01 PROCEDURE — 77067 SCR MAMMO BI INCL CAD: CPT | Mod: TC

## 2019-10-21 ENCOUNTER — OFFICE VISIT (OUTPATIENT)
Dept: OPTOMETRY | Facility: CLINIC | Age: 64
End: 2019-10-21
Payer: COMMERCIAL

## 2019-10-21 ENCOUNTER — APPOINTMENT (OUTPATIENT)
Dept: OPTOMETRY | Facility: CLINIC | Age: 64
End: 2019-10-21
Payer: COMMERCIAL

## 2019-10-21 DIAGNOSIS — H02.834 DERMATOCHALASIS OF BOTH UPPER EYELIDS: ICD-10-CM

## 2019-10-21 DIAGNOSIS — H02.831 DERMATOCHALASIS OF BOTH UPPER EYELIDS: ICD-10-CM

## 2019-10-21 DIAGNOSIS — H52.203 HYPEROPIA OF BOTH EYES WITH ASTIGMATISM AND PRESBYOPIA: ICD-10-CM

## 2019-10-21 DIAGNOSIS — H52.4 HYPEROPIA OF BOTH EYES WITH ASTIGMATISM AND PRESBYOPIA: ICD-10-CM

## 2019-10-21 DIAGNOSIS — Z01.00 EXAMINATION OF EYES AND VISION: Primary | ICD-10-CM

## 2019-10-21 DIAGNOSIS — H52.03 HYPEROPIA OF BOTH EYES WITH ASTIGMATISM AND PRESBYOPIA: ICD-10-CM

## 2019-10-21 DIAGNOSIS — H04.123 DRY EYE SYNDROME OF BOTH EYES: ICD-10-CM

## 2019-10-21 DIAGNOSIS — H25.813 COMBINED FORMS OF AGE-RELATED CATARACT OF BOTH EYES: ICD-10-CM

## 2019-10-21 PROCEDURE — 92014 COMPRE OPH EXAM EST PT 1/>: CPT | Performed by: OPTOMETRIST

## 2019-10-21 PROCEDURE — 92341 FIT SPECTACLES BIFOCAL: CPT | Performed by: OPTOMETRIST

## 2019-10-21 PROCEDURE — 92015 DETERMINE REFRACTIVE STATE: CPT | Performed by: OPTOMETRIST

## 2019-10-21 ASSESSMENT — VISUAL ACUITY
OD_SC: 20/30
OS_CC: 20/30
CORRECTION_TYPE: GLASSES
OS_CC+: -2
METHOD: SNELLEN - LINEAR
OD_CC: 20/25
OS_SC: 20/40
OS_CC: 20/25
OD_CC+: -1
OD_CC: 20/30-1

## 2019-10-21 ASSESSMENT — REFRACTION_WEARINGRX
OS_ADD: +2.50
OD_CYLINDER: +1.25
OD_SPHERE: +1.25
SPECS_TYPE: BIFOCAL
OS_AXIS: 180
OS_CYLINDER: +1.00
OD_AXIS: 011
OD_ADD: +2.50
OS_SPHERE: +1.00

## 2019-10-21 ASSESSMENT — SLIT LAMP EXAM - LIDS: COMMENTS: MEIBOMIAN GLAND DYSFUNCTION, UPPER LID DERMATOCHALASIS

## 2019-10-21 ASSESSMENT — REFRACTION_MANIFEST
OD_ADD: +2.50
OS_SPHERE: +1.00
OS_AXIS: 180
OS_ADD: +2.50
OD_CYLINDER: +1.25
OS_CYLINDER: +1.50
OD_AXIS: 011
OD_SPHERE: +1.50

## 2019-10-21 ASSESSMENT — TONOMETRY
OS_IOP_MMHG: 17
OD_IOP_MMHG: 17
IOP_METHOD: TONOPEN

## 2019-10-21 ASSESSMENT — EXTERNAL EXAM - RIGHT EYE: OD_EXAM: NORMAL

## 2019-10-21 ASSESSMENT — CONF VISUAL FIELD
OD_NORMAL: 1
OS_NORMAL: 1

## 2019-10-21 ASSESSMENT — CUP TO DISC RATIO
OD_RATIO: 0.4
OS_RATIO: 0.4

## 2019-10-21 ASSESSMENT — EXTERNAL EXAM - LEFT EYE: OS_EXAM: NORMAL

## 2019-10-21 NOTE — PROGRESS NOTES
Chief Complaint   Patient presents with     Annual Eye Exam      Accompanied by   Last Eye Exam: 10-5-2018  Dilated Previously: Yes    What are you currently using to see?  glasses       Distance Vision Acuity: Satisfied with vision    Near Vision Acuity: Satisfied with vision while reading  with glasses    Eye Comfort: eyes get tired fast  Do you use eye drops? 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          Medical, surgical and family histories reviewed and updated 10/21/2019.       OBJECTIVE: See Ophthalmology exam    ASSESSMENT:    ICD-10-CM    1. Examination of eyes and vision Z01.00 EYE EXAM (SIMPLE-NONBILLABLE)   2. Hyperopia of both eyes with astigmatism and presbyopia H52.03 REFRACTION    H52.203     H52.4    3. Combined forms of age-related cataract of both eyes H25.813 EYE EXAM (SIMPLE-NONBILLABLE)   4. Dry eye syndrome of both eyes H04.123 EYE EXAM (SIMPLE-NONBILLABLE)     polyvinyl alcohol-povidone PF (REFRESH) 1.4-0.6 % ophthalmic solution   5. Dermatochalasis of both upper eyelids H02.831 EYE EXAM (SIMPLE-NONBILLABLE)    H02.834 OPHTHALMOLOGY ADULT REFERRAL      PLAN:     Patient Instructions   Eyeglass prescription given.    Refresh tears 1 drop 2-4x daily.    You have the start of mild cataracts.  You may notice some blurred vision or glare with night driving.  It is important that you wear good sunglasses to protect your eyes from the ultraviolet light from the sun.  I recommend that you return in 1 year for an eye exam unless there are any sudden changes in your vision.     Referral to Dr. Sanket Bello at the [facility] for lid evaluation- [phone].      Return in 1 year for a complete eye exam or sooner if needed.    Roshan Kay, OD

## 2019-10-21 NOTE — LETTER
10/21/2019         RE: Margaret Heayl  [address] [address]        Dear Colleague,    Thank you for referring your patient, Margaret Healy, to the [facility]. Please see a copy of my visit note below.    Chief Complaint   Patient presents with     Annual Eye Exam      Accompanied by   Last Eye Exam: 10-5-2018  Dilated Previously: Yes    What are you currently using to see?  glasses       Distance Vision Acuity: Satisfied with vision    Near Vision Acuity: Satisfied with vision while reading  with glasses    Eye Comfort: eyes get tired fast  Do you use eye drops? 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          Medical, surgical and family histories reviewed and updated 10/21/2019.       OBJECTIVE: See Ophthalmology exam    ASSESSMENT:    ICD-10-CM    1. Examination of eyes and vision Z01.00 EYE EXAM (SIMPLE-NONBILLABLE)   2. Hyperopia of both eyes with astigmatism and presbyopia H52.03 REFRACTION    H52.203     H52.4    3. Combined forms of age-related cataract of both eyes H25.813 EYE EXAM (SIMPLE-NONBILLABLE)   4. Dry eye syndrome of both eyes H04.123 EYE EXAM (SIMPLE-NONBILLABLE)     polyvinyl alcohol-povidone PF (REFRESH) 1.4-0.6 % ophthalmic solution   5. Dermatochalasis of both upper eyelids H02.831 EYE EXAM (SIMPLE-NONBILLABLE)    H02.834 OPHTHALMOLOGY ADULT REFERRAL      PLAN:     Patient Instructions   Eyeglass prescription given.    Refresh tears 1 drop 2-4x daily.    You have the start of mild cataracts.  You may notice some blurred vision or glare with night driving.  It is important that you wear good sunglasses to protect your eyes from the ultraviolet light from the sun.  I recommend that you return in 1 year for an eye exam unless there are any sudden changes in your vision.     Referral to Dr. Sanket Bello at the [facility] for lid evaluation- [phone].      Return in 1 year  for a complete eye exam or sooner if needed.    Roshan Kay, OD           Again, thank you for allowing me to participate in the care of your patient.        Sincerely,        Roshan Kay, OD

## 2019-10-21 NOTE — PATIENT INSTRUCTIONS
Eyeglass prescription given.    Refresh tears 1 drop 2-4x daily.    You have the start of mild cataracts.  You may notice some blurred vision or glare with night driving.  It is important that you wear good sunglasses to protect your eyes from the ultraviolet light from the sun.  I recommend that you return in 1 year for an eye exam unless there are any sudden changes in your vision.     Referral to Dr. Sanket Bello at the [facility] for lid evaluation- [phone].      Return in 1 year for a complete eye exam or sooner if needed.    Roshan Kay, OD    The affects of the dilating drops last for 4- 6 hours.  You will be more sensitive to light and vision will be blurry up close.  Mydriatic sunglasses were given if needed.      Optometry Providers       Clinic Locations                                 Telephone Number   Dr. Barbara Silveira   [place] [facility]   [place] [phone]     [facility] Hours:                [facility] Hours:       [facility] Hours:   [address]   [address] [address]     [address] [place] [address]   [place] [address]    [place] [address]  Phone: [phone]                    Phone: [phone]     Phone: [phone]                      Monday 8:00-7:00                          Monday 8:00-7:00                          Monday 8:00-7:00              Tuesday 8:00-6:00                          Tuesday 8:00-7:00                          Tuesday 8:00-7:00              Wednesday 8:00-6:00                  Wednesday 8:00-7:00                   Wednesday 8:00-7:00      Thursday 8:00-6:00                        Thursday 8:00-7:00                         Thursday 8:00-7:00            Friday 8:00-5:00                              Friday 8:00-5:00                              Friday 8:00-5:00    [facility] Hours:   [address] [place]  [address]  [phone]    Monday 8:00-7:00  Tuesday 8:00-7:00  Wednesday 8:00-7:00  Thursday 8:00-7:00  Friday 8:00-5:00  Please log on to [email or web address] to order your contact lenses.  The link is found on the [facility] page.  As always, Thank you for trusting us with your health care needs!

## 2019-10-23 ENCOUNTER — OFFICE VISIT (OUTPATIENT)
Dept: OPHTHALMOLOGY | Facility: CLINIC | Age: 64
End: 2019-10-23
Attending: OPTOMETRIST
Payer: COMMERCIAL

## 2019-10-23 DIAGNOSIS — H02.831 DERMATOCHALASIS OF BOTH UPPER EYELIDS: Primary | ICD-10-CM

## 2019-10-23 DIAGNOSIS — H57.819 BROW PTOSIS: ICD-10-CM

## 2019-10-23 DIAGNOSIS — H02.834 DERMATOCHALASIS OF BOTH UPPER EYELIDS: Primary | ICD-10-CM

## 2019-10-23 PROCEDURE — 99203 OFFICE O/P NEW LOW 30 MIN: CPT | Performed by: OPHTHALMOLOGY

## 2019-10-23 PROCEDURE — 92081 LIMITED VISUAL FIELD XM: CPT | Performed by: OPHTHALMOLOGY

## 2019-10-23 PROCEDURE — 92285 EXTERNAL OCULAR PHOTOGRAPHY: CPT | Performed by: OPHTHALMOLOGY

## 2019-10-23 ASSESSMENT — VISUAL ACUITY
OS_SC: 20/30-2
METHOD: SNELLEN - LINEAR
OD_SC: 20/40+

## 2019-10-23 ASSESSMENT — TONOMETRY
OD_IOP_MMHG: 14
OS_IOP_MMHG: 14
IOP_METHOD: ICARE

## 2019-10-23 ASSESSMENT — SLIT LAMP EXAM - LIDS: COMMENTS: HEAVY DERMATOCHALASIS RESTING ON LASHES

## 2019-10-23 ASSESSMENT — CONF VISUAL FIELD
METHOD: COUNTING FINGERS
OD_NORMAL: 1
OS_NORMAL: 1

## 2019-10-23 NOTE — LETTER
10/23/2019         RE:  :  MRN: Margaret Healy  1955  [id number]     Dear Dr. Kay,    Thank you for asking me to see your patient, Margaret Healy, for an oculoplastic   consultation.  My assessment and plan are below.  For further details, please see my attached clinic note.           HPI:     Chief Complaint(s) and History of Present Illness(es)     Consult for Blepharoplasty of BUL               Comments     Pt referred by Dr. Kay for bleph eval each eye.  Pt states that right   eye is worse than left.  This has been bothering her for 10 years.  In   2019 she had upper lid lift in [place] and it did help but not as   much as she would have liked.  She feels her lids are getting in the way   of her vision right eye more than left eye, and lids seem to be drooping   back to what they used to be before the sx 6 months ago.    Mariama Mon, COT 8:09 AM 10/23/2019        The droopy eyelid is interfering with activities of daily living including driving, and reading. The patient denies double vision, variability of the eyelid position, or dry eye symptoms.     EXAM:     MRD1: 1 right eye and about 2 on the left   Dermatochalasis with excess skin touching eyelashes much more on the right  Brow ptosis with brow resting below superior orbital rim on the right more than the left    VISUAL FIELD:  Right eye untaped:0 degrees Right eye taped:20 degrees  Left eye untaped:0 degrees Left eye taped:18 degrees    Assessment & Plan     Margaret Healy is a 64 year old female with the following diagnoses:   1. Dermatochalasis of both upper eyelids    2. Brow ptosis       S/p surgery in [place] but still symptomatic.   Most noticeable is brow ptosis more on the right and asymmetric crease. She is happy with the left eye     Right upper eyelid blepharoplasty revision with ibp (15241).     (not interested in endobrow or lateral brow lift).     ANTICOAGULATION:  Omega 3      Again, thank you for allowing me to  participate in the care of your patient.      Sincerely,    Sanket Bello MD  Department of Ophthalmology and Visual Neurosciences  [facility]    [place]: [place]  [address]  [facility] [address]  VIA In Basket

## 2019-10-23 NOTE — PROGRESS NOTES
[facility] New Patient    Patient: Margaret Healy MRN# [id number]   YOB: 1955 Age: 64 year old   Date of Visit: Oct 23, 2019    CC: Droopy eyelids obstructing vision.              HPI:     Chief Complaint(s) and History of Present Illness(es)     Consult for Blepharoplasty of BUL               Comments     Pt referred by Dr. Kay for bleph eval each eye.  Pt states that right   eye is worse than left.  This has been bothering her for 10 years.  In   March of 2019 she had upper lid lift in [place] and it did help but not as   much as she would have liked.  She feels her lids are getting in the way   of her vision right eye more than left eye, and lids seem to be drooping   back to what they used to be before the sx 6 months ago.    Mariama Mon, COT 8:09 AM 10/23/2019        The droopy eyelid is interfering with activities of daily living including driving, and reading. The patient denies double vision, variability of the eyelid position, or dry eye symptoms.     EXAM:     MRD1: 1 right eye and about 2 on the left   Dermatochalasis with excess skin touching eyelashes much more on the right  Brow ptosis with brow resting below superior orbital rim on the right more than the left    VISUAL FIELD:  Right eye untaped:0 degrees Right eye taped:20 degrees  Left eye untaped:0 degrees Left eye taped:18 degrees    Assessment & Plan     Margaret Healy is a 64 year old female with the following diagnoses:   1. Dermatochalasis of both upper eyelids    2. Brow ptosis       S/p surgery in [facility] but still symptomatic.   Most noticeable is brow ptosis more on the right and asymmetric crease. She is happy with the left eye     Right upper eyelid blepharoplasty revision with ibp ([id number]).     (not interested in endobrow or lateral brow lift).     ANTICOAGULATION:  Omega 3       PHOTOS DEMONSTRATE:    Significant dermatochalasis with lids resting on eyelashes and obstructing visual axis worse on the right.    Brow ptosis with thicker brow skin and hairs below the lateral superior orbital rim    Attending Physician Attestation:  Complete documentation of historical and exam elements from today's encounter can be found in the full encounter summary report (not reduplicated in this progress note).  I personally obtained the chief complaint(s) and history of present illness.  I confirmed and edited as necessary the review of systems, past medical/surgical history, family history, social history, and examination findings as documented by others; and I examined the patient myself.  I personally reviewed the relevant tests, images, and reports as documented above.  I formulated and edited as necessary the assessment and plan and discussed the findings and management plan with the patient and family. - Sanket Bello MD        Today with Margaret Healy  and her , I reviewed the indications, risks, benefits, and alternatives of the proposed surgical procedure including, but not limited to, failure obtain the desired result  and need for additional surgery, bleeding, infection, loss of vision, loss of the eye, and the remote possibility of permanent damage to any organ system or death with the use of anesthesia.  I provided multiple opportunities for the questions, answered all questions to the best of my ability, and confirmed that my answers and my discussion were understood.

## 2019-12-17 ENCOUNTER — OFFICE VISIT (OUTPATIENT)
Dept: FAMILY MEDICINE | Facility: CLINIC | Age: 64
End: 2019-12-17
Payer: COMMERCIAL

## 2019-12-17 VITALS
HEIGHT: 59 IN | BODY MASS INDEX: 21.61 KG/M2 | SYSTOLIC BLOOD PRESSURE: 138 MMHG | OXYGEN SATURATION: 95 % | HEART RATE: 77 BPM | TEMPERATURE: 97.9 F | RESPIRATION RATE: 16 BRPM | WEIGHT: 107.2 LBS | DIASTOLIC BLOOD PRESSURE: 83 MMHG

## 2019-12-17 DIAGNOSIS — Z01.818 PREOP GENERAL PHYSICAL EXAM: Primary | ICD-10-CM

## 2019-12-17 DIAGNOSIS — H57.819 BROW PTOSIS: ICD-10-CM

## 2019-12-17 DIAGNOSIS — E78.2 MIXED HYPERLIPIDEMIA: ICD-10-CM

## 2019-12-17 DIAGNOSIS — H02.834 DERMATOCHALASIS OF BOTH UPPER EYELIDS: ICD-10-CM

## 2019-12-17 DIAGNOSIS — H02.831 DERMATOCHALASIS OF BOTH UPPER EYELIDS: ICD-10-CM

## 2019-12-17 PROCEDURE — 93000 ELECTROCARDIOGRAM COMPLETE: CPT | Performed by: PREVENTIVE MEDICINE

## 2019-12-17 PROCEDURE — 99214 OFFICE O/P EST MOD 30 MIN: CPT | Performed by: PREVENTIVE MEDICINE

## 2019-12-17 ASSESSMENT — MIFFLIN-ST. JEOR: SCORE: 945.85

## 2019-12-17 NOTE — PATIENT INSTRUCTIONS
Before Your Surgery      Call your surgeon if there is any change in your health. This includes signs of a cold or flu (such as a sore throat, runny nose, cough, rash or fever).    Do not smoke, drink alcohol or take over the counter medicine (unless your surgeon or primary care doctor tells you to) for the 24 hours before and after surgery.    If you take prescribed drugs: Follow your doctor s orders about which medicines to take and which to stop until after surgery.    Eating and drinking prior to surgery: follow the instructions from your surgeon    Take a shower or bath the night before surgery. Use the soap your surgeon gave you to gently clean your skin. If you do not have soap from your surgeon, use your regular soap. Do not shave or scrub the surgery site.  Wear clean pajamas and have clean sheets on your bed.     At [facility], we strive to deliver an exceptional experience to you, every time we see you. If you receive a survey, please complete it as we do value your feedback.  If you have MyChart, you can expect to receive results automatically within 24 hours of their completion.  Your provider will send a note interpreting your results as well.   If you do not have MyChart, you should receive your results in about a week by mail.    Your care team:     Family Medicine   JENNYFER Carmona MD Emily Bunt, APRN CNP   S. MD Kiara Metzger MD Angela Wermerskirchen, MD    Internal Medicine  Donato Abdi MD coming 2020     Clinic hours: Monday - Wednesday 7 am-7 pm   Thursdays and Fridays 7 am-5 pm.     [facility]: Monday - Friday 11 am-9 pm,   Saturday and Sunday 9 am-5 pm.    [facility]: Monday -Thursday 8 am-8 pm; Friday 8 am-6 pm; Saturday and Sunday 9 am-5 pm.     [facility]: Monday - Thursday 8 am - 7 pm; Friday 8 am - 6 pm    Clinic: [phone]   [facility]  Pharmacy: [phone]   [facility]: [phone]

## 2019-12-17 NOTE — PROGRESS NOTES
[address] [address]  [phone]  Dept: [phone]    PRE-OP EVALUATION:  Today's date: 2019    Margaret Healy (: 1955) presents for pre-operative evaluation assessment as requested by Sanket Becker,.  She requires evaluation and anesthesia risk assessment prior to undergoing surgery/procedure for treatment of Right upper eyelid.    Proposed Surgery/ Procedure: Right upper eyelid blepharoplasty  Date of Surgery/ Procedure: 19  Time of Surgery/ Procedure: 10:20AM  Hospital/Surgical Facility: [facility]   Fax number for surgical facility: Not needed   Primary Physician: No Ref-Primary, Physician  Type of Anesthesia Anticipated: Combined MAC with Local    Patient has a Health Care Directive or Living Will:  NO    1. NO - Do you have a history of heart attack, stroke, stent, bypass or surgery on an artery in the head, neck, heart or legs?  2. NO - Do you ever have any pain or discomfort in your chest?  3. NO - Do you have a history of  Heart Failure?  4. NO - Are you troubled by shortness of breath when: walking on the level, up a slight hill or at night?  5. NO - Do you currently have a cold, bronchitis or other respiratory infection?  6. NO - Do you have a cough, shortness of breath or wheezing?  7. NO - Do you sometimes get pains in the calves of your legs when you walk?  8. NO - Do you or anyone in your family have previous history of blood clots?  9. NO - Do you or does anyone in your family have a serious bleeding problem such as prolonged bleeding following surgeries or cuts?  10. NO - Have you ever had problems with anemia or been told to take iron pills?  11. NO - Have you had any abnormal blood loss such as black, tarry or bloody stools, or abnormal vaginal bleeding?  12. NO - Have you ever had a blood transfusion?  13. NO - Have you or any of your relatives ever had problems with anesthesia?  14. NO - Do you have sleep  apnea, excessive snoring or daytime drowsiness?  15. NO - Do you have any prosthetic heart valves?  16. NO - Do you have prosthetic joints?  17. NO - Is there any chance that you may be pregnant?      HPI:     HPI related to upcoming procedure: History of surgery in [place]. Still with brow ptosis more on the right side.       See problem list for active medical problems.  Problems all longstanding and stable, except as noted/documented.  See ROS for pertinent symptoms related to these conditions.      MEDICAL HISTORY:     Patient Active Problem List    Diagnosis Date Noted     Dermatochalasis of both upper eyelids 10/23/2019     Priority: Medium     Added automatically from request for surgery [id number]       Brow ptosis 10/23/2019     Priority: Medium     Added automatically from request for surgery [id number]       Mixed hyperlipidemia 09/18/2019     Priority: Medium     Impaired fasting glucose 09/18/2019     Priority: Medium     Combined forms of age-related cataract of both eyes 10/05/2018     Priority: Medium     Meibomian gland disease, unspecified laterality 10/05/2018     Priority: Medium      History reviewed. No pertinent past medical history.  History reviewed. No pertinent surgical history.  Current Outpatient Medications   Medication Sig Dispense Refill     atorvastatin (LIPITOR) 20 MG tablet Take 1 tablet (20 mg) by mouth daily 90 tablet 3     Omega-3 Fatty Acids (FISH OIL PO)        VITAMIN D, CHOLECALCIFEROL, PO Take by mouth daily       biotin 2.5 mg/mL SUSP Take by mouth daily       COLLAGEN PO        hydrocortisone (ANUSOL-HC) 2.5 % cream Place rectally 2 times daily as needed for hemorrhoids (Patient not taking: Reported on 12/17/2019) 30 g 1     polyvinyl alcohol-povidone PF (REFRESH) 1.4-0.6 % ophthalmic solution Place 1-2 drops into both eyes 4 times daily (Patient not taking: Reported on 12/17/2019) 90 each 3     UNABLE TO FIND MEDICATION NAME: [employer] Women's 2 gummy's       OTC products:  "herbals and vitamins - omega 3 fish oils     Allergies   Allergen Reactions     B1-Ca      No Clinical Screening - See Comments Other (See Comments)     Vitamin B1 - feels like she is going to pass out when taking this medication      Latex Allergy: NO    Social History     Tobacco Use     Smoking status: Never Smoker     Smokeless tobacco: Never Used   Substance Use Topics     Alcohol use: No     History   Drug Use No       REVIEW OF SYSTEMS:   Constitutional, HEENT, cardiovascular, pulmonary, gi and gu systems are negative, except as otherwise noted.    EXAM:   /83 (BP Location: Right arm, Patient Position: Sitting, Cuff Size: Adult Regular)   Pulse 77   Temp 97.9  F (36.6  C) (Oral)   Resp 16   Ht 1.505 m (4' 11.25\")   Wt 48.6 kg (107 lb 3.2 oz)   LMP  (LMP Unknown)   SpO2 95%   BMI 21.47 kg/m      GENERAL APPEARANCE: healthy, alert and no distress     EYES: EOMI, PERRL     HENT: ear canals and TM's normal and nose and mouth without ulcers or lesions     NECK: no adenopathy, no asymmetry, masses     RESP: lungs clear to auscultation - no rales, rhonchi or wheezes     CV: regular rates and rhythm, normal S1 S2, no S3 or S4 and no murmur, click or rub     ABDOMEN:  soft, nontender, no rebound or guarding      MS: extremities normal- no gross deformities noted, no evidence of inflammation in joints, FROM in all extremities.     SKIN: no suspicious lesions or rashes     NEURO: Normal strength and tone, sensory exam grossly normal, mentation intact and speech normal     PSYCH: mentation appears normal. and affect normal/bright     LYMPHATICS: No cervical adenopathy    DIAGNOSTICS:   EKG: appears normal, NSR, there are no prior tracings available, no acute ST changes     Labs Resulted Today: No results found for any visits on 12/17/19.    Recent Labs   Lab Test 09/17/18  0839 09/22/17  1045   HGB  --  12.7   PLT  --  201    139   POTASSIUM 3.4 3.9   CR 0.58 0.69        IMPRESSION:   Reason for " surgery/procedure: Brow ptosis   Diagnosis/reason for consult: Pre operative evaluation     The proposed surgical procedure is considered INTERMEDIATE risk.    REVISED CARDIAC RISK INDEX  The patient has the following serious cardiovascular risks for perioperative complications such as (MI, PE, VFib and 3  AV Block):  No serious cardiac risks  INTERPRETATION: 0 risks: Class I (very low risk - 0.4% complication rate)    The patient has the following additional risks for perioperative complications:  No identified additional risks  The 10-year ASCVD risk score (Wiltonjesu RAI Jr., et al., 2013) is: 5.8%    Values used to calculate the score:      Age: 64 years      Sex: Female      Is Non- : No      Diabetic: No      Tobacco smoker: No      Systolic Blood Pressure: 138 mmHg      Is BP treated: No      HDL Cholesterol: 80 mg/dL      Total Cholesterol: 277 mg/dL      ICD-10-CM    1. Preop general physical exam Z01.818 EKG 12-lead complete w/read - Clinics   2. Dermatochalasis of both upper eyelids H02.831     H02.834    3. Brow ptosis H57.819    4. Mixed hyperlipidemia E78.2        RECOMMENDATIONS:       --Patient is to take all scheduled medications on the day of surgery EXCEPT for modifications listed below.    Anticoagulant or Antiplatelet Medication Use  Hold Omega 3 fish oils for 5-7 days prior to procedure         APPROVAL GIVEN to proceed with proposed procedure, without further diagnostic evaluation       Signed Electronically by: Nila Montalvo MD MPH    Copy of this evaluation report is provided to requesting physician.    [facility] Preop Guidelines    Revised Cardiac Risk Index

## 2019-12-17 NOTE — PROGRESS NOTES
Faxed Pre-op notes, Ekg and no labs to [facility], [phone], right fax confirmed at 9:56 am today, 12/17/19.  Layla Santos [facility]  2nd Floor  Primary Care

## 2019-12-26 ENCOUNTER — ANESTHESIA EVENT (OUTPATIENT)
Dept: SURGERY | Facility: AMBULATORY SURGERY CENTER | Age: 64
End: 2019-12-26

## 2019-12-30 ENCOUNTER — SURGERY (OUTPATIENT)
Age: 64
End: 2019-12-30
Payer: COMMERCIAL

## 2019-12-30 ENCOUNTER — HOSPITAL ENCOUNTER (OUTPATIENT)
Facility: AMBULATORY SURGERY CENTER | Age: 64
Discharge: HOME | End: 2019-12-30
Attending: OPHTHALMOLOGY | Admitting: OPHTHALMOLOGY
Payer: COMMERCIAL

## 2019-12-30 ENCOUNTER — ANESTHESIA (OUTPATIENT)
Dept: SURGERY | Facility: AMBULATORY SURGERY CENTER | Age: 64
End: 2019-12-30
Payer: COMMERCIAL

## 2019-12-30 VITALS
DIASTOLIC BLOOD PRESSURE: 71 MMHG | OXYGEN SATURATION: 96 % | SYSTOLIC BLOOD PRESSURE: 125 MMHG | TEMPERATURE: 99 F | RESPIRATION RATE: 16 BRPM | HEART RATE: 95 BPM

## 2019-12-30 DIAGNOSIS — H02.834 DERMATOCHALASIS OF BOTH UPPER EYELIDS: ICD-10-CM

## 2019-12-30 DIAGNOSIS — H57.819 BROW PTOSIS: ICD-10-CM

## 2019-12-30 DIAGNOSIS — Z98.890 POSTSURGICAL STATE, EYE: Primary | ICD-10-CM

## 2019-12-30 DIAGNOSIS — H02.831 DERMATOCHALASIS OF BOTH UPPER EYELIDS: ICD-10-CM

## 2019-12-30 PROCEDURE — G8907 PT DOC NO EVENTS ON DISCHARG: HCPCS

## 2019-12-30 PROCEDURE — 15823 BLEPHARP UPR EYELID XCSV SKN: CPT | Mod: RT | Performed by: OPHTHALMOLOGY

## 2019-12-30 PROCEDURE — 15823 BLEPHARP UPR EYELID XCSV SKN: CPT | Mod: E3

## 2019-12-30 PROCEDURE — G8918 PT W/O PREOP ORDER IV AB PRO: HCPCS

## 2019-12-30 RX ORDER — LIDOCAINE HYDROCHLORIDE 20 MG/ML
INJECTION, SOLUTION INFILTRATION; PERINEURAL PRN
Status: DISCONTINUED | OUTPATIENT
Start: 2019-12-30 | End: 2019-12-30

## 2019-12-30 RX ORDER — NALOXONE HYDROCHLORIDE 0.4 MG/ML
.1-.4 INJECTION, SOLUTION INTRAMUSCULAR; INTRAVENOUS; SUBCUTANEOUS
Status: DISCONTINUED | OUTPATIENT
Start: 2019-12-30 | End: 2019-12-31 | Stop reason: HOSPADM

## 2019-12-30 RX ORDER — FENTANYL CITRATE 50 UG/ML
25-50 INJECTION, SOLUTION INTRAMUSCULAR; INTRAVENOUS
Status: DISCONTINUED | OUTPATIENT
Start: 2019-12-30 | End: 2019-12-31 | Stop reason: HOSPADM

## 2019-12-30 RX ORDER — HYDROMORPHONE HYDROCHLORIDE 1 MG/ML
.3-.5 INJECTION, SOLUTION INTRAMUSCULAR; INTRAVENOUS; SUBCUTANEOUS EVERY 10 MIN PRN
Status: DISCONTINUED | OUTPATIENT
Start: 2019-12-30 | End: 2019-12-31 | Stop reason: HOSPADM

## 2019-12-30 RX ORDER — ONDANSETRON 4 MG/1
4 TABLET, ORALLY DISINTEGRATING ORAL EVERY 30 MIN PRN
Status: DISCONTINUED | OUTPATIENT
Start: 2019-12-30 | End: 2019-12-31 | Stop reason: HOSPADM

## 2019-12-30 RX ORDER — ACETAMINOPHEN 325 MG/1
975 TABLET ORAL ONCE
Status: COMPLETED | OUTPATIENT
Start: 2019-12-30 | End: 2019-12-30

## 2019-12-30 RX ORDER — ONDANSETRON 2 MG/ML
4 INJECTION INTRAMUSCULAR; INTRAVENOUS EVERY 30 MIN PRN
Status: DISCONTINUED | OUTPATIENT
Start: 2019-12-30 | End: 2019-12-31 | Stop reason: HOSPADM

## 2019-12-30 RX ORDER — TETRACAINE HYDROCHLORIDE 5 MG/ML
SOLUTION OPHTHALMIC PRN
Status: DISCONTINUED | OUTPATIENT
Start: 2019-12-30 | End: 2019-12-30 | Stop reason: HOSPADM

## 2019-12-30 RX ORDER — ERYTHROMYCIN 5 MG/G
OINTMENT OPHTHALMIC
Qty: 1 TUBE | Refills: 0 | Status: SHIPPED | OUTPATIENT
Start: 2019-12-30 | End: 2023-04-18

## 2019-12-30 RX ORDER — ERYTHROMYCIN 5 MG/G
OINTMENT OPHTHALMIC PRN
Status: DISCONTINUED | OUTPATIENT
Start: 2019-12-30 | End: 2019-12-30 | Stop reason: HOSPADM

## 2019-12-30 RX ORDER — SODIUM CHLORIDE, SODIUM LACTATE, POTASSIUM CHLORIDE, CALCIUM CHLORIDE 600; 310; 30; 20 MG/100ML; MG/100ML; MG/100ML; MG/100ML
INJECTION, SOLUTION INTRAVENOUS CONTINUOUS
Status: DISCONTINUED | OUTPATIENT
Start: 2019-12-30 | End: 2019-12-31 | Stop reason: HOSPADM

## 2019-12-30 RX ORDER — MEPERIDINE HYDROCHLORIDE 25 MG/ML
12.5 INJECTION INTRAMUSCULAR; INTRAVENOUS; SUBCUTANEOUS
Status: DISCONTINUED | OUTPATIENT
Start: 2019-12-30 | End: 2019-12-31 | Stop reason: HOSPADM

## 2019-12-30 RX ORDER — OXYCODONE HYDROCHLORIDE 5 MG/1
5 TABLET ORAL EVERY 4 HOURS PRN
Status: DISCONTINUED | OUTPATIENT
Start: 2019-12-30 | End: 2019-12-31 | Stop reason: HOSPADM

## 2019-12-30 RX ORDER — LIDOCAINE 40 MG/G
CREAM TOPICAL
Status: DISCONTINUED | OUTPATIENT
Start: 2019-12-30 | End: 2019-12-31 | Stop reason: HOSPADM

## 2019-12-30 RX ORDER — FENTANYL CITRATE 50 UG/ML
INJECTION, SOLUTION INTRAMUSCULAR; INTRAVENOUS PRN
Status: DISCONTINUED | OUTPATIENT
Start: 2019-12-30 | End: 2019-12-30

## 2019-12-30 RX ORDER — PROPOFOL 10 MG/ML
INJECTION, EMULSION INTRAVENOUS PRN
Status: DISCONTINUED | OUTPATIENT
Start: 2019-12-30 | End: 2019-12-30

## 2019-12-30 RX ORDER — GABAPENTIN 300 MG/1
300 CAPSULE ORAL ONCE
Status: COMPLETED | OUTPATIENT
Start: 2019-12-30 | End: 2019-12-30

## 2019-12-30 RX ADMIN — TETRACAINE HYDROCHLORIDE 1 DROP: 5 SOLUTION OPHTHALMIC at 10:55

## 2019-12-30 RX ADMIN — SODIUM CHLORIDE, SODIUM LACTATE, POTASSIUM CHLORIDE, CALCIUM CHLORIDE: 600; 310; 30; 20 INJECTION, SOLUTION INTRAVENOUS at 09:36

## 2019-12-30 RX ADMIN — GABAPENTIN 300 MG: 300 CAPSULE ORAL at 09:24

## 2019-12-30 RX ADMIN — FENTANYL CITRATE 25 MCG: 50 INJECTION, SOLUTION INTRAMUSCULAR; INTRAVENOUS at 10:40

## 2019-12-30 RX ADMIN — PROPOFOL 70 MG: 10 INJECTION, EMULSION INTRAVENOUS at 10:45

## 2019-12-30 RX ADMIN — ACETAMINOPHEN 975 MG: 325 TABLET ORAL at 09:24

## 2019-12-30 RX ADMIN — PROPOFOL 10 MG: 10 INJECTION, EMULSION INTRAVENOUS at 10:46

## 2019-12-30 RX ADMIN — LIDOCAINE HYDROCHLORIDE 60 MG: 20 INJECTION, SOLUTION INFILTRATION; PERINEURAL at 10:45

## 2019-12-30 RX ADMIN — ERYTHROMYCIN 1 INCH: 5 OINTMENT OPHTHALMIC at 10:55

## 2019-12-30 NOTE — ANESTHESIA CARE TRANSFER NOTE
Patient: Margaret T Healy    Procedure(s):  Right upper eyelid blepharoplasty    Diagnosis: Dermatochalasis of both upper eyelids [H02.831, H02.834]  Brow ptosis [H57.819]  Diagnosis Additional Information: No value filed.    Anesthesia Type:   MAC     Note:  Airway :Room Air  Patient transferred to:Phase II  Comments: To Phase II. Report to RN.  VSS Resp status stable.Handoff Report: Identifed the Patient, Identified the Reponsible Provider, Reviewed the pertinent medical history, Discussed the surgical course, Reviewed Intra-OP anesthesia mangement and issues during anesthesia, Set expectations for post-procedure period and Allowed opportunity for questions and acknowledgement of understanding      Vitals: (Last set prior to Anesthesia Care Transfer)    CRNA VITALS  12/30/2019 1036 - 12/30/2019 1109      12/30/2019             Pulse:  81    SpO2:  93 %                Electronically Signed By: JAQUAN Leggett CRNA  December 30, 2019  11:09 AM

## 2019-12-30 NOTE — ANESTHESIA PREPROCEDURE EVALUATION
Anesthesia Pre-Procedure Evaluation    Patient: Margaret Healy   MRN:     [id number] Gender:   female   Age:    64 year old :      1955        Preoperative Diagnosis: Dermatochalasis of both upper eyelids [H02.831, H02.834]  Brow ptosis [H57.819]   Procedure(s):  Right upper eyelid blepharoplasty     History reviewed. No pertinent past medical history.   History reviewed. No pertinent surgical history.       Anesthesia Evaluation     .             ROS/MED HX    ENT/Pulmonary:  - neg pulmonary ROS     Neurologic:  - neg neurologic ROS     Cardiovascular:  - neg cardiovascular ROS   (+) Dyslipidemia, ----. : . . . :. .       METS/Exercise Tolerance:     Hematologic:  - neg hematologic  ROS       Musculoskeletal:  - neg musculoskeletal ROS       GI/Hepatic:  - neg GI/hepatic ROS       Renal/Genitourinary:  - ROS Renal section negative       Endo:  - neg endo ROS       Psychiatric:  - neg psychiatric ROS       Infectious Disease:  - neg infectious disease ROS       Malignancy:      - no malignancy   Other:    - neg other ROS                     PHYSICAL EXAM:   Mental Status/Neuro: A/A/O   Airway: Facies: Feasible  Mallampati: I  Mouth/Opening: Full  TM distance: > 6 cm  Neck ROM: Full   Respiratory: Auscultation: CTAB     Resp. Rate: Normal     Resp. Effort: Normal      CV: Rhythm: Regular  Rate: Age appropriate  Heart: Normal Sounds  Edema: None   Comments:      Dental: Normal Dentition                LABS:  CBC:   Lab Results   Component Value Date    WBC 4.6 2017    HGB 12.7 2017    HCT 37.4 2017     2017     BMP:   Lab Results   Component Value Date     2018     2017    POTASSIUM 3.4 2018    POTASSIUM 3.9 2017    CHLORIDE 103 2018    CHLORIDE 102 2017    CO2 28 2018    CO2 29 2017    BUN 13 2018    BUN 9 2017    CR 0.58 2018    CR 0.69 2017     (H) 2019    GLC 97 2018  "    COAGS: No results found for: PTT, INR, FIBR  POC: No results found for: BGM, HCG, HCGS  OTHER:   Lab Results   Component Value Date    ADRIAN 9.1 09/17/2018    ALBUMIN 3.9 09/22/2017    PROTTOTAL 8.3 09/22/2017    ALT 24 09/22/2017    AST 25 09/22/2017    ALKPHOS 108 09/22/2017    BILITOTAL 1.0 09/22/2017    TSH 0.66 09/22/2017        Preop Vitals    BP Readings from Last 3 Encounters:   12/30/19 137/76   12/17/19 138/83   09/18/19 138/80    Pulse Readings from Last 3 Encounters:   12/30/19 95   12/17/19 77   09/18/19 77      Resp Readings from Last 3 Encounters:   12/30/19 14   12/17/19 16   09/18/19 16    SpO2 Readings from Last 3 Encounters:   12/30/19 99%   12/17/19 95%   09/18/19 97%      Temp Readings from Last 1 Encounters:   12/30/19 37.2  C (99  F) (Temporal)    Ht Readings from Last 1 Encounters:   12/17/19 1.505 m (4' 11.25\")      Wt Readings from Last 1 Encounters:   12/17/19 48.6 kg (107 lb 3.2 oz)    Estimated body mass index is 21.47 kg/m  as calculated from the following:    Height as of 12/17/19: 1.505 m (4' 11.25\").    Weight as of 12/17/19: 48.6 kg (107 lb 3.2 oz).     LDA:  Peripheral IV 12/30/19 Right Lower forearm (Active)   Site Assessment WDL 12/30/2019  9:25 AM   Line Status Infusing 12/30/2019  9:25 AM   Phlebitis Scale 0-->no symptoms 12/30/2019  9:25 AM   Infiltration Scale 0 12/30/2019  9:25 AM   Infiltration Site Treatment Method  None 12/30/2019  9:25 AM   Extravasation? No 12/30/2019  9:25 AM   Dressing Intervention New dressing  12/30/2019  9:25 AM   Number of days: 0        Assessment:   ASA SCORE: 1    H&P: History and physical reviewed and following examination; no interval change.   Smoking Status:  Non-Smoker/Unknown   NPO Status: NPO Appropriate     Plan:   Anes. Type:  MAC   Pre-Medication: None   Induction:  N/a   Airway: Native Airway   Access/Monitoring: PIV   Maintenance: Propofol Sedation     Postop Plan:   Postop Pain: None  Postop Sedation/Airway: Not " planned  Disposition: Outpatient     PONV Management:   Adult Risk Factors: Female, Non-Smoker   Prevention: Ondansetron, Propofol     CONSENT: Direct conversation   Plan and risks discussed with: Patient   Blood Products: Consent Deferred (Minimal Blood Loss)                   Acosta Diaz MD

## 2019-12-30 NOTE — DISCHARGE INSTRUCTIONS
Post-operative Instructions  Ophthalmic Plastic and Reconstructive Surgery    Sanket Bello M.D.     All instructions apply to the operated eye(s) or eyelid(s).    Wound care and personal care  ? Apply ice compresses 15 minutes of every hour while awake for 2 days. As long as there is no further bleeding, after two days, switch to warm water compresses for five minutes, four times a day until seen by your physician. Instead of warm water compresses, you can use a cup of dry uncooked rice in a clean cotton sock. Place sock in microwave for 30 seconds to one minute. Next place the warm sock in a plastic bag, and place it over a clean warm wet washcloth over operated eye.    ? You may shower or wash your hair the day after surgery. Do not go swimming for at least 2 weeks to prevent contamination of your wounds.  ? Do not apply make-up to the eyes or eyelids for 2 weeks after surgery.  ? Expect some swelling, bruising, black eye (even into the lower eyelids and cheeks). Also expect serum caking, crusting and discharge from the eye and/or incisions. A small amount of surface bleeding, and depending on the type of surgery, bleeding from the inside of the eyelid, is normal for the first 48 hours.  ? Avoid straining, bending at the waist, or lifting more than 15 pounds for 10 days. Activities that raise your blood pressure can worsen swelling, cause bleeding, and breaking of sutures. Like wise, sleeping with your head slightly elevated for the first several days can help swelling resolve more quickly.   ? Do continue to ambulate (walk) as you normally would - being sedentary after surgery can cause blood clots.   ? Your eye(s) and eyelid(s) may be painful and tender. This is normal after surgery.      Contact information and follow-up  ? Return to the Eye Clinic for a follow-up appointment with your physician as scheduled. If no appointment has been scheduled:   - [facility] eye clinic: [phone] for an  appointment with Dr. Bello within 1 to 2 weeks from your date of surgery.   -  [facility] eye clinic: [phone] for an appointment with Dr. Bello within 1 to 2 weeks from your date of surgery.     ? For severe pain, bleeding, or loss of vision, call the [facility] at [phone] or [facility] at [phone].     After hours or on weekends and holidays, call [phone] and ask to speak with the ophthalmologist on call.    An on call person can be reached after hours for concerns. The on call doctor should not call in medication refill requests after hours or on weekends, so please plan accordingly. An effort has been made to provide adequate pain medications following every surgery, and refills will not be provided in most instances. Narcotic pain medications cannot be called in.     Activity restrictions and driving  ? Avoid heavy lifting, bending, exercise or strenuous activity for 1 week after surgery.  You may resume other activities and return to work as tolerated.  ? You may not resume driving if you are using narcotic pain medications (such as Norco, Percocet, Tylenol #3).    Medications  ? Restart all regular home medications and eye drops. If you take Plavix or  Aspirin on a regular basis, wait for 72 hours after your surgery before restarting these in order to decrease the risk of bleeding complications.  ? Avoid aspirin and aspirin-like medications (Motrin, Aleve, Ibuprofen, Isis-[place] etc) for 72 hours to reduce the risk of bleeding. You may take Tylenol (acetaminophen) for pain.  ? In addition to your home medications, take the following post-operative medications as prescribed by your physician.    ? Apply antibiotic ointment to all sutures three times a day  ? In many cases, postoperative discomfort can be managed with Tylenol alone. If narcotic pain medication was prescribed, take pain pills at prescribed frequency as needed for  pain.    ? WARNING: Most prescription pain medications contain Tylenol  (acetaminophen). You must not take more than 4,000 mg of acetaminophen per  24-hour period. This is equivalent to 6 tablets of Darvocet, 12 tablets of Norco, Percocet or Tylenol #3. If you take other over-the-counter medications containing acetaminophen, you must take the amount of acetaminophen into account and reduce the number of prescribed pain pills accordingly.  ? Prescribed narcotic medications may make you drowsy. You must not drive a car, operate heavy machinery or drink alcohol while taking them.  ? Prescribed narcotic pain medications may cause constipation and nausea. Take them with some food to prevent a stomach upset.      [facility]  Same-Day Surgery   Adult Discharge Orders & Instructions   For 24 hours after surgery  1. Get plenty of rest.  A responsible adult must stay with you for at least 24 hours after you leave the hospital.   2. Do not drive or use heavy equipment.  If you have weakness or tingling, don't drive or use heavy equipment until this feeling goes away.  3. Do not drink alcohol.  4. Avoid strenuous or risky activities.  Ask for help when climbing stairs.   5. You may feel lightheaded.  IF so, sit for a few minutes before standing.  Have someone help you get up.   6. If you have nausea (feel sick to your stomach): Drink only clear liquids such as apple juice, ginger ale, broth or 7-Up.  Rest may also help.  Be sure to drink enough fluids.  Move to a regular diet as you feel able.  7. You may have a slight fever. Call the doctor if your fever is over 100 F (37.7 C) (taken under the tongue) or lasts longer than 24 hours.  8. You may have a dry mouth, a sore throat, muscle aches or trouble sleeping.  These should go away after 24 hours.  9. Do not make important or legal decisions.   Call your doctor for any of the followin.  Signs of infection (fever, growing tenderness at the surgery  site, a large amount of drainage or bleeding, severe pain, foul-smelling drainage, redness, swelling).    2. It has been over 8 to 10 hours since surgery and you are still not able to urinate (pass water).    3.  Headache for over 24 hours.

## 2020-01-22 ENCOUNTER — OFFICE VISIT (OUTPATIENT)
Dept: OPHTHALMOLOGY | Facility: CLINIC | Age: 65
End: 2020-01-22
Payer: COMMERCIAL

## 2020-01-22 DIAGNOSIS — H02.834 DERMATOCHALASIS OF BOTH UPPER EYELIDS: ICD-10-CM

## 2020-01-22 DIAGNOSIS — H57.819 BROW PTOSIS: ICD-10-CM

## 2020-01-22 DIAGNOSIS — Z98.890 POSTOPERATIVE EYE STATE: Primary | ICD-10-CM

## 2020-01-22 DIAGNOSIS — H02.831 DERMATOCHALASIS OF BOTH UPPER EYELIDS: ICD-10-CM

## 2020-01-22 PROCEDURE — 99024 POSTOP FOLLOW-UP VISIT: CPT | Mod: GC | Performed by: OPHTHALMOLOGY

## 2020-01-22 ASSESSMENT — VISUAL ACUITY
OD_CC: 20/40
OD_CC+: -2
OS_CC: 20/25
METHOD: SNELLEN - LINEAR
CORRECTION_TYPE: GLASSES
OS_CC+: +2

## 2020-01-22 ASSESSMENT — TONOMETRY
OS_IOP_MMHG: 12
IOP_METHOD: ICARE
OD_IOP_MMHG: 13

## 2020-01-22 NOTE — PROGRESS NOTES
Chief Complaint(s) and History of Present Illness(es)     Post Op (Ophthalmology) Both Eyes     Laterality: both eyes              Comments     S/p right upper blepharoplasty and IBP 12/30/2019. No problems/concerns. Using   erythromycin BID.        Patient is doing well. No concerns. Happy with the results. She is still using the erythromycin ointment.  She uses warm water cloth on the eyes in the morning.       Assessment & Plan     Margaret Healy is a 64 year old female with the following diagnoses:   1. Postoperative eye state      Patient is healing well. Right eyelid and eyebrow are at good position. Left and right eyelids/eyebrows are symmetrical.   Mp/vaseline  Continue with warm compress.   Return in 6-8 weeks or sooner as needed.        Dianna Parks MD  Ophthalmology Resident- PGY-2    Attending Physician Attestation:  Complete documentation of historical and exam elements from today's encounter can be found in the full encounter summary report (not reduplicated in this progress note).  I personally obtained the chief complaint(s) and history of present illness.  I confirmed and edited as necessary the review of systems, past medical/surgical history, family history, social history, and examination findings as documented by others; and I examined the patient myself.  I personally reviewed the relevant tests, images, and reports as documented above.  I formulated and edited as necessary the assessment and plan and discussed the findings and management plan with the patient and family. I personally reviewed the ophthalmic test(s) associated with this encounter, agree with the interpretation(s) as documented by the resident/fellow, and have edited the corresponding report(s) as necessary.   -Sanket Bello MD

## 2020-10-26 ENCOUNTER — OFFICE VISIT (OUTPATIENT)
Dept: FAMILY MEDICINE | Facility: CLINIC | Age: 65
End: 2020-10-26
Payer: COMMERCIAL

## 2020-10-26 VITALS
SYSTOLIC BLOOD PRESSURE: 133 MMHG | DIASTOLIC BLOOD PRESSURE: 74 MMHG | OXYGEN SATURATION: 98 % | HEIGHT: 60 IN | RESPIRATION RATE: 16 BRPM | BODY MASS INDEX: 20.81 KG/M2 | HEART RATE: 76 BPM | WEIGHT: 106 LBS | TEMPERATURE: 98 F

## 2020-10-26 DIAGNOSIS — Z78.0 POSTMENOPAUSAL STATUS: ICD-10-CM

## 2020-10-26 DIAGNOSIS — E78.2 MIXED HYPERLIPIDEMIA: ICD-10-CM

## 2020-10-26 DIAGNOSIS — Z00.00 ENCOUNTER FOR MEDICARE ANNUAL WELLNESS EXAM: Primary | ICD-10-CM

## 2020-10-26 DIAGNOSIS — R41.3 MEMORY CHANGES: ICD-10-CM

## 2020-10-26 DIAGNOSIS — U07.1 2019 NOVEL CORONAVIRUS DISEASE (COVID-19): ICD-10-CM

## 2020-10-26 DIAGNOSIS — M81.0 AGE-RELATED OSTEOPOROSIS WITHOUT CURRENT PATHOLOGICAL FRACTURE: ICD-10-CM

## 2020-10-26 LAB
CHOLEST SERPL-MCNC: 233 MG/DL
GLUCOSE SERPL-MCNC: 92 MG/DL (ref 70–99)
HDLC SERPL-MCNC: 74 MG/DL
LDLC SERPL CALC-MCNC: 139 MG/DL
NONHDLC SERPL-MCNC: 159 MG/DL
TRIGL SERPL-MCNC: 102 MG/DL

## 2020-10-26 PROCEDURE — 90471 IMMUNIZATION ADMIN: CPT | Performed by: PHYSICIAN ASSISTANT

## 2020-10-26 PROCEDURE — 82947 ASSAY GLUCOSE BLOOD QUANT: CPT | Performed by: PHYSICIAN ASSISTANT

## 2020-10-26 PROCEDURE — 99213 OFFICE O/P EST LOW 20 MIN: CPT | Mod: 25 | Performed by: PHYSICIAN ASSISTANT

## 2020-10-26 PROCEDURE — 36415 COLL VENOUS BLD VENIPUNCTURE: CPT | Performed by: PHYSICIAN ASSISTANT

## 2020-10-26 PROCEDURE — 80061 LIPID PANEL: CPT | Performed by: PHYSICIAN ASSISTANT

## 2020-10-26 PROCEDURE — 99397 PER PM REEVAL EST PAT 65+ YR: CPT | Mod: 25 | Performed by: PHYSICIAN ASSISTANT

## 2020-10-26 PROCEDURE — 90662 IIV NO PRSV INCREASED AG IM: CPT | Performed by: PHYSICIAN ASSISTANT

## 2020-10-26 RX ORDER — ATORVASTATIN CALCIUM 20 MG/1
20 TABLET, FILM COATED ORAL DAILY
Qty: 90 TABLET | Refills: 3 | Status: SHIPPED | OUTPATIENT
Start: 2020-10-26 | End: 2020-10-26

## 2020-10-26 RX ORDER — ATORVASTATIN CALCIUM 20 MG/1
20 TABLET, FILM COATED ORAL DAILY
Qty: 90 TABLET | Refills: 3 | Status: SHIPPED | OUTPATIENT
Start: 2020-10-26 | End: 2021-03-04

## 2020-10-26 ASSESSMENT — PAIN SCALES - GENERAL: PAINLEVEL: NO PAIN (0)

## 2020-10-26 ASSESSMENT — MIFFLIN-ST. JEOR: SCORE: 939.37

## 2020-10-26 NOTE — PROGRESS NOTES
"  SUBJECTIVE:   Margaret Healy is a 65 year old female who presents for Preventive Visit.      Patient has been advised of split billing requirements and indicates understanding: Yes  Are you in the first 12 months of your Medicare Part B coverage?  No    Physical Health:    In general, how would you rate your overall physical health? good    Outside of work, how many days during the week do you exercise? none    Outside of work, approximately how many minutes a day do you exercise?not applicable    If you drink alcohol do you typically have >3 drinks per day or >7 drinks per week? Not Applicable    Do you usually eat at least 4 servings of fruit and vegetables a day, include whole grains & fiber and avoid regularly eating high fat or \"junk\" foods? Yes    Do you have any problems taking medications regularly?  No    Do you have any side effects from medications? none    Needs assistance for the following daily activities: no assistance needed    Which of the following safety concerns are present in your home?  none identified     Hearing impairment: No    In the past 6 months, have you been bothered by leaking of urine? no      Mental Health:    In general, how would you rate your overall mental or emotional health? 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 On 11/4/20 she will be switching her work schedule and hopes that would help with sleep.     Do you feel safe in your environment? Yes    Have you ever done Advance Care Planning? (For example, a Health Directive, POLST, or a discussion with a medical provider or your loved ones about your wishes): No, advance care planning information given to patient to review.  Patient declined advance care planning discussion at this time.    Additional concerns to address?  YES  1. Patient was diagnosed with Covid on 9/14/20. She has recovered from it fully, but now feels a little more fatigue than usual.   Patient was seen at [facility]. Patient " was retested on 10/1 and was negative.     2. Patient also reports that she gets bilateral calf cramping for many years and it happens when she points her toes in an attempt to stretch the  Legs. No swelling, no redness, no pain.     3. Patient reports that she has hard time studying English. She has no issues remembering daily activities.   Started a few months ago.     Fall risk:  Fall Risk Assessment not completed.  click delete button to remove this line now  Cognitive Screenin) could not be done due to     Mini-CogTM Copyright LEON Espinoza. Licensed by the author for use in [facility]; reprinted with permission ([email or web address]@[facility]). All rights reserved.      Do you have sleep apnea, excessive snoring or daytime drowsiness?: no            Reviewed and updated as needed this visit by clinical staff  Tobacco  Allergies  Meds  Problems  Med Hx  Surg Hx  Fam Hx  Soc Hx          Reviewed and updated as needed this visit by Provider  Tobacco  Allergies  Meds  Problems  Med Hx  Surg Hx  Fam Hx         Social History     Tobacco Use     Smoking status: Never Smoker     Smokeless tobacco: Never Used   Substance Use Topics     Alcohol use: No                           Current providers sharing in care for this patient include:   Patient Care Team:  No Ref-Primary, Physician as PCP - Nila Mcdonough MD as Assigned PCP    The following health maintenance items are reviewed in Epic and correct as of today:  Health Maintenance   Topic Date Due     DEXA  1955     ADVANCE CARE PLANNING  1955     ZOSTER IMMUNIZATION (1 of 2) 2005     FALL RISK ASSESSMENT  2020     Pneumococcal Vaccine: 65+ Years (1 of 1 - PPSV23) 2020     MAMMO SCREENING  10/01/2021     MEDICARE ANNUAL WELLNESS VISIT  10/26/2021     DTAP/TDAP/TD IMMUNIZATION (3 - Td) 01/15/2025     LIPID  10/26/2025     COLORECTAL CANCER SCREENING  2026     HEPATITIS C SCREENING  Completed     HIV SCREENING   Completed     PHQ-2  Completed     INFLUENZA VACCINE  Completed     Pneumococcal Vaccine: Pediatrics (0 to 5 Years) and At-Risk Patients (6 to 64 Years)  Aged Out     IPV IMMUNIZATION  Aged Out     MENINGITIS IMMUNIZATION  Aged Out     HEPATITIS B IMMUNIZATION  Aged Out     Patient Active Problem List   Diagnosis     Combined forms of age-related cataract of both eyes     Meibomian gland disease, unspecified laterality     Mixed hyperlipidemia     Impaired fasting glucose     Dermatochalasis of both upper eyelids     Brow ptosis     Past Surgical History:   Procedure Laterality Date     BLEPHAROPLASTY Right 12/30/2019    Procedure: Right upper eyelid blepharoplasty;  Surgeon: Sanket Bello MD;  Location:  OR       Social History     Tobacco Use     Smoking status: Never Smoker     Smokeless tobacco: Never Used   Substance Use Topics     Alcohol use: No     Family History   Problem Relation Age of Onset     Cataracts Mother      Hypertension Mother      Cancer Father      Glaucoma No family hx of      Macular Degeneration No family hx of          Current Outpatient Medications   Medication Sig Dispense Refill     atorvastatin (LIPITOR) 20 MG tablet Take 1 tablet (20 mg) by mouth daily 90 tablet 3     biotin 2.5 mg/mL SUSP Take by mouth daily       COLLAGEN PO        hydrocortisone (ANUSOL-HC) 2.5 % cream Place rectally 2 times daily as needed for hemorrhoids 30 g 1     Omega-3 Fatty Acids (FISH OIL PO)        UNABLE TO FIND MEDICATION NAME: [employer] Women's 2 gummy's       VITAMIN D, CHOLECALCIFEROL, PO Take by mouth daily       erythromycin (ROMYCIN) 5 MG/GM ophthalmic ointment Apply a thin strip to the incisions 3 times a day (Patient not taking: Reported on 10/26/2020) 1 Tube 0     Allergies   Allergen Reactions     B1-Ca      No Clinical Screening - See Comments Other (See Comments)     Vitamin B1 - feels like she is going to pass out when taking this medication     Pneumonia Vaccine:Adults age 65+ who  "have not received previous Pneumovax (PPSV23) or PCV13 as an adult: Should first be given PCV13 AND then should be given PPSV23 6-12 months after PCV13  Mammogram Screening: Mammogram Screening: Patient over age 50, mutual decision to screen reflected in health maintenance.  Last 3 Pap and HPV Results:   PAP / HPV Latest Ref Rng & Units 9/22/2017   PAP - NIL   HPV 16 DNA NEG:Negative Negative   HPV 18 DNA NEG:Negative Negative   OTHER HR HPV NEG:Negative Negative       ROS:  Constitutional, HEENT, cardiovascular, pulmonary, GI, , musculoskeletal, neuro, skin, endocrine and psych systems are negative, except as otherwise noted.    OBJECTIVE:   /74   Pulse 76   Temp 98  F (36.7  C) (Oral)   Resp 16   Ht 1.511 m (4' 11.5\")   Wt 48.1 kg (106 lb)   LMP  (LMP Unknown)   SpO2 98%   BMI 21.05 kg/m   Estimated body mass index is 21.05 kg/m  as calculated from the following:    Height as of this encounter: 1.511 m (4' 11.5\").    Weight as of this encounter: 48.1 kg (106 lb).  EXAM:   GENERAL: healthy, alert and no distress  EYES: Eyes grossly normal to inspection, PERRL and conjunctivae and sclerae normal  HENT: ear canals and TM's normal, nose and mouth without ulcers or lesions  NECK: no adenopathy, no asymmetry, masses, or scars and thyroid normal to palpation  RESP: lungs clear to auscultation - no rales, rhonchi or wheezes  BREAST: normal without masses, tenderness or nipple discharge and no palpable axillary masses or adenopathy  CV: regular rate and rhythm, normal S1 S2, no S3 or S4, no murmur, click or rub, no peripheral edema and peripheral pulses strong  ABDOMEN: soft, nontender, no hepatosplenomegaly, no masses and bowel sounds normal  MS: no gross musculoskeletal defects noted, no edema  SKIN: no suspicious lesions or rashes  NEURO: Normal strength and tone, mentation intact and speech normal  PSYCH: mentation appears normal, affect normal/bright    Diagnostic Test Results:  Labs reviewed in " "Epic    ASSESSMENT / PLAN:       ICD-10-CM    1. Encounter for Medicare annual wellness exam  Z00.00 Glucose   2. Mixed hyperlipidemia  E78.2 Lipid Profile (Chol, Trig, HDL, LDL calc)     atorvastatin (LIPITOR) 20 MG tablet     DISCONTINUED: atorvastatin (LIPITOR) 20 MG tablet   3. Postmenopausal status  Z78.0 DEXA HIP/PELVIS/SPINE - Future   4. Memory changes  R41.3 NEUROLOGY ADULT REFERRAL   5. 2019 novel coronavirus disease (COVID-19)  U07.1    2. Restart medication   3. Schedule Dexa scan  4. Make appointment with neurology  5. Recovered well. No chest pain, no shortness of breath, no wheezing     Patient has been advised of split billing requirements and indicates understanding: Yes    COUNSELING:  Reviewed preventive health counseling, as reflected in patient instructions       Vision screening       Hearing screening       Immunizations    Vaccinated for: Influenza             Osteoporosis Prevention/Bone Health    Estimated body mass index is 21.05 kg/m  as calculated from the following:    Height as of this encounter: 1.511 m (4' 11.5\").    Weight as of this encounter: 48.1 kg (106 lb).        She reports that she has never smoked. She has never used smokeless tobacco.    Appropriate preventive services were discussed with this patient, including applicable screening as appropriate for cardiovascular disease, diabetes, osteopenia/osteoporosis, and glaucoma.  As appropriate for age/gender, discussed screening for colorectal cancer, prostate cancer, breast cancer, and cervical cancer. Checklist reviewing preventive services available has been given to the patient.    Reviewed patients plan of care and provided an AVS. The Basic Care Plan (routine screening as documented in Health Maintenance) for Margaret meets the Care Plan requirement. This Care Plan has been established and reviewed with the Patient.    Counseling Resources:  ATP IV Guidelines  Pooled Cohorts Equation Calculator  Breast Cancer Risk " Calculator  BRCA-Related Cancer Risk Assessment: FHS-7 Tool  FRAX Risk Assessment  ICSI Preventive Guidelines  Dietary Guidelines for Americans, 2010  USDA's MyPlate  ASA Prophylaxis  Lung CA Screening    JENNYFER Ricardo [facility]

## 2020-10-26 NOTE — LETTER
October 27, 2020      Margaret Healy  [address] [address]        Dear m,    Blood test is within normal limits     If you have any questions or concerns, please call the clinic at the number listed above.       Sincerely,        Olesya Heredia PA-C

## 2020-10-27 ENCOUNTER — APPOINTMENT (OUTPATIENT)
Dept: INTERPRETER SERVICES | Facility: CLINIC | Age: 65
End: 2020-10-27
Payer: COMMERCIAL

## 2020-11-19 ENCOUNTER — ANCILLARY PROCEDURE (OUTPATIENT)
Dept: BONE DENSITY | Facility: CLINIC | Age: 65
End: 2020-11-19
Attending: PHYSICIAN ASSISTANT
Payer: COMMERCIAL

## 2020-11-19 DIAGNOSIS — M81.8 OTHER OSTEOPOROSIS WITHOUT CURRENT PATHOLOGICAL FRACTURE: ICD-10-CM

## 2020-11-19 DIAGNOSIS — Z78.0 POSTMENOPAUSAL STATUS: ICD-10-CM

## 2020-11-19 PROCEDURE — 77080 DXA BONE DENSITY AXIAL: CPT | Performed by: RADIOLOGY

## 2020-11-19 PROCEDURE — 77081 DXA BONE DENSITY APPENDICULR: CPT | Mod: 59 | Performed by: RADIOLOGY

## 2020-11-22 RX ORDER — ALENDRONATE SODIUM 70 MG/1
70 TABLET ORAL
Qty: 12 TABLET | Refills: 4 | Status: SHIPPED | OUTPATIENT
Start: 2020-11-22 | End: 2021-03-04

## 2020-12-08 ENCOUNTER — ANCILLARY PROCEDURE (OUTPATIENT)
Dept: MAMMOGRAPHY | Facility: CLINIC | Age: 65
End: 2020-12-08
Payer: COMMERCIAL

## 2020-12-08 DIAGNOSIS — Z12.31 VISIT FOR SCREENING MAMMOGRAM: ICD-10-CM

## 2020-12-08 PROCEDURE — 77067 SCR MAMMO BI INCL CAD: CPT | Performed by: RADIOLOGY

## 2020-12-09 ENCOUNTER — DOCUMENTATION ONLY (OUTPATIENT)
Dept: OPHTHALMOLOGY | Facility: CLINIC | Age: 65
End: 2020-12-09

## 2020-12-09 NOTE — PROGRESS NOTES
I received a mammogram result of this patient. My name was used to order the mammogram, but I did not put in or authorize the order. I have sent a message to patient's PCP Olesya Heredia (JENNYFER) and the ordering user of this order Faith Clark, so that they can find out who the ordering physician is supposed to be and communicate the result to this patient.     Dianna Parks MD  Ophthalmology PGY-3

## 2020-12-09 NOTE — RESULT ENCOUNTER NOTE
Hi, this is Dianna, ophthalmology resident. Somehow my name was used to order the mammogram for this patient. I did not put in the order. Please review this patient's mammogram result and communicate with her. Would you also help find out who is supposed to be the ordering physician? Thank you so much!

## 2021-03-04 DIAGNOSIS — M81.0 AGE-RELATED OSTEOPOROSIS WITHOUT CURRENT PATHOLOGICAL FRACTURE: ICD-10-CM

## 2021-03-04 DIAGNOSIS — E78.2 MIXED HYPERLIPIDEMIA: ICD-10-CM

## 2021-03-04 RX ORDER — ATORVASTATIN CALCIUM 20 MG/1
20 TABLET, FILM COATED ORAL DAILY
Qty: 90 TABLET | Refills: 3 | Status: SHIPPED | OUTPATIENT
Start: 2021-03-04 | End: 2022-04-14

## 2021-03-04 RX ORDER — ALENDRONATE SODIUM 70 MG/1
70 TABLET ORAL
Qty: 12 TABLET | Refills: 4 | Status: SHIPPED | OUTPATIENT
Start: 2021-03-04 | End: 2022-04-14

## 2021-03-04 NOTE — TELEPHONE ENCOUNTER
Hydrocortisone (Anusol-HC) cannot be ordered as a prescription.   Routing to provider to advise.  Zandra Lazcano BSN, RN

## 2021-03-04 NOTE — TELEPHONE ENCOUNTER
Patient's brother is calling needing [facility]) [facility]     ator[facility]atin (LIPITOR) 20 MG tablet  hydrocortisone (ANUSOL-HC) 2.5 % cream  alendronate (FOSAMAX) 70 MG tablet      KEDAR Cespedes

## 2021-10-09 ENCOUNTER — HEALTH MAINTENANCE LETTER (OUTPATIENT)
Age: 66
End: 2021-10-09

## 2021-12-04 ENCOUNTER — HEALTH MAINTENANCE LETTER (OUTPATIENT)
Age: 66
End: 2021-12-04

## 2022-04-14 ENCOUNTER — OFFICE VISIT (OUTPATIENT)
Dept: FAMILY MEDICINE | Facility: CLINIC | Age: 67
End: 2022-04-14
Payer: COMMERCIAL

## 2022-04-14 VITALS
TEMPERATURE: 97.1 F | HEIGHT: 60 IN | RESPIRATION RATE: 16 BRPM | SYSTOLIC BLOOD PRESSURE: 135 MMHG | WEIGHT: 106 LBS | DIASTOLIC BLOOD PRESSURE: 74 MMHG | HEART RATE: 73 BPM | OXYGEN SATURATION: 100 % | BODY MASS INDEX: 20.81 KG/M2

## 2022-04-14 DIAGNOSIS — Z12.31 ENCOUNTER FOR SCREENING MAMMOGRAM FOR BREAST CANCER: ICD-10-CM

## 2022-04-14 DIAGNOSIS — E78.2 MIXED HYPERLIPIDEMIA: ICD-10-CM

## 2022-04-14 DIAGNOSIS — R74.8 ELEVATED LIVER ENZYMES: ICD-10-CM

## 2022-04-14 DIAGNOSIS — R68.89 COLD INTOLERANCE: ICD-10-CM

## 2022-04-14 DIAGNOSIS — M81.0 AGE-RELATED OSTEOPOROSIS WITHOUT CURRENT PATHOLOGICAL FRACTURE: ICD-10-CM

## 2022-04-14 DIAGNOSIS — R73.01 IMPAIRED FASTING GLUCOSE: ICD-10-CM

## 2022-04-14 DIAGNOSIS — K64.4 EXTERNAL HEMORRHOIDS: ICD-10-CM

## 2022-04-14 DIAGNOSIS — L30.9 ECZEMA, UNSPECIFIED TYPE: ICD-10-CM

## 2022-04-14 DIAGNOSIS — H81.11 BENIGN PAROXYSMAL POSITIONAL VERTIGO OF RIGHT EAR: ICD-10-CM

## 2022-04-14 DIAGNOSIS — Z00.00 ENCOUNTER FOR MEDICARE ANNUAL WELLNESS EXAM: Primary | ICD-10-CM

## 2022-04-14 LAB
ALBUMIN SERPL-MCNC: 4.3 G/DL (ref 3.4–5)
ALP SERPL-CCNC: 115 U/L (ref 40–150)
ALT SERPL W P-5'-P-CCNC: 78 U/L (ref 0–50)
ANION GAP SERPL CALCULATED.3IONS-SCNC: 9 MMOL/L (ref 3–14)
AST SERPL W P-5'-P-CCNC: 62 U/L (ref 0–45)
BILIRUB SERPL-MCNC: 1 MG/DL (ref 0.2–1.3)
BUN SERPL-MCNC: 13 MG/DL (ref 7–30)
CALCIUM SERPL-MCNC: 9.7 MG/DL (ref 8.5–10.1)
CHLORIDE BLD-SCNC: 101 MMOL/L (ref 94–109)
CHOLEST SERPL-MCNC: 271 MG/DL
CO2 SERPL-SCNC: 27 MMOL/L (ref 20–32)
CREAT SERPL-MCNC: 0.58 MG/DL (ref 0.52–1.04)
ERYTHROCYTE [DISTWIDTH] IN BLOOD BY AUTOMATED COUNT: 11.8 % (ref 10–15)
FASTING STATUS PATIENT QL REPORTED: NO
GFR SERPL CREATININE-BSD FRML MDRD: >90 ML/MIN/1.73M2
GLUCOSE BLD-MCNC: 101 MG/DL (ref 70–99)
HBA1C MFR BLD: 5.5 % (ref 0–5.6)
HCT VFR BLD AUTO: 38.9 % (ref 35–47)
HDLC SERPL-MCNC: 83 MG/DL
HGB BLD-MCNC: 13.1 G/DL (ref 11.7–15.7)
LDLC SERPL CALC-MCNC: 162 MG/DL
MCH RBC QN AUTO: 32.2 PG (ref 26.5–33)
MCHC RBC AUTO-ENTMCNC: 33.7 G/DL (ref 31.5–36.5)
MCV RBC AUTO: 96 FL (ref 78–100)
NONHDLC SERPL-MCNC: 188 MG/DL
PLATELET # BLD AUTO: 192 10E3/UL (ref 150–450)
POTASSIUM BLD-SCNC: 3.7 MMOL/L (ref 3.4–5.3)
PROT SERPL-MCNC: 8.8 G/DL (ref 6.8–8.8)
RBC # BLD AUTO: 4.07 10E6/UL (ref 3.8–5.2)
SODIUM SERPL-SCNC: 137 MMOL/L (ref 133–144)
TRIGL SERPL-MCNC: 129 MG/DL
TSH SERPL DL<=0.005 MIU/L-ACNC: 0.62 MU/L (ref 0.4–4)
WBC # BLD AUTO: 6.2 10E3/UL (ref 4–11)

## 2022-04-14 PROCEDURE — 99397 PER PM REEVAL EST PAT 65+ YR: CPT | Performed by: PHYSICIAN ASSISTANT

## 2022-04-14 PROCEDURE — 99214 OFFICE O/P EST MOD 30 MIN: CPT | Mod: 25 | Performed by: PHYSICIAN ASSISTANT

## 2022-04-14 PROCEDURE — 82306 VITAMIN D 25 HYDROXY: CPT | Performed by: PHYSICIAN ASSISTANT

## 2022-04-14 PROCEDURE — 86706 HEP B SURFACE ANTIBODY: CPT | Performed by: PHYSICIAN ASSISTANT

## 2022-04-14 PROCEDURE — 80061 LIPID PANEL: CPT | Performed by: PHYSICIAN ASSISTANT

## 2022-04-14 PROCEDURE — 86803 HEPATITIS C AB TEST: CPT | Performed by: PHYSICIAN ASSISTANT

## 2022-04-14 PROCEDURE — 85027 COMPLETE CBC AUTOMATED: CPT | Performed by: PHYSICIAN ASSISTANT

## 2022-04-14 PROCEDURE — 84443 ASSAY THYROID STIM HORMONE: CPT | Performed by: PHYSICIAN ASSISTANT

## 2022-04-14 PROCEDURE — 36415 COLL VENOUS BLD VENIPUNCTURE: CPT | Performed by: PHYSICIAN ASSISTANT

## 2022-04-14 PROCEDURE — 80053 COMPREHEN METABOLIC PANEL: CPT | Performed by: PHYSICIAN ASSISTANT

## 2022-04-14 PROCEDURE — 87340 HEPATITIS B SURFACE AG IA: CPT | Performed by: PHYSICIAN ASSISTANT

## 2022-04-14 PROCEDURE — 83036 HEMOGLOBIN GLYCOSYLATED A1C: CPT | Performed by: PHYSICIAN ASSISTANT

## 2022-04-14 RX ORDER — ATORVASTATIN CALCIUM 20 MG/1
20 TABLET, FILM COATED ORAL DAILY
Qty: 90 TABLET | Refills: 3 | Status: SHIPPED | OUTPATIENT
Start: 2022-04-14 | End: 2023-04-18

## 2022-04-14 RX ORDER — HYDROCORTISONE 25 MG/G
CREAM TOPICAL 2 TIMES DAILY PRN
Qty: 30 G | Refills: 3 | Status: SHIPPED | OUTPATIENT
Start: 2022-04-14 | End: 2023-04-18

## 2022-04-14 RX ORDER — TRIAMCINOLONE ACETONIDE 1 MG/G
CREAM TOPICAL 2 TIMES DAILY
Qty: 80 G | Refills: 1 | Status: SHIPPED | OUTPATIENT
Start: 2022-04-14 | End: 2023-04-18

## 2022-04-14 RX ORDER — ALENDRONATE SODIUM 70 MG/1
70 TABLET ORAL
Qty: 12 TABLET | Refills: 4 | Status: SHIPPED | OUTPATIENT
Start: 2022-04-14 | End: 2023-04-18

## 2022-04-14 ASSESSMENT — ENCOUNTER SYMPTOMS
FEVER: 0
HEADACHES: 0
MYALGIAS: 1
DIZZINESS: 0
WEAKNESS: 0
NAUSEA: 0
CHILLS: 0
HEMATURIA: 0
HEMATOCHEZIA: 0
SHORTNESS OF BREATH: 0
NERVOUS/ANXIOUS: 1
PALPITATIONS: 0
HEARTBURN: 1
ABDOMINAL PAIN: 0
FREQUENCY: 0
DIARRHEA: 0
DYSURIA: 0
SORE THROAT: 0
EYE PAIN: 0
BREAST MASS: 0
PARESTHESIAS: 0
COUGH: 0
CONSTIPATION: 0
JOINT SWELLING: 0
ARTHRALGIAS: 0

## 2022-04-14 ASSESSMENT — PAIN SCALES - GENERAL: PAINLEVEL: NO PAIN (0)

## 2022-04-14 ASSESSMENT — ACTIVITIES OF DAILY LIVING (ADL): CURRENT_FUNCTION: TRANSPORTATION REQUIRES ASSISTANCE

## 2022-04-14 NOTE — PATIENT INSTRUCTIONS
Please call  Radiology at [phone] to schedule your appointment for dexa bone scan and mammogram     Patient Education   Personalized Prevention Plan  You are due for the preventive services outlined below.  Your care team is available to assist you in scheduling these services.  If you have already completed any of these items, please share that information with your care team to update in your medical record.  Health Maintenance Due   Topic Date Due    ANNUAL REVIEW OF HM ORDERS  Never done    COVID-19 Vaccine (1) Never done    Zoster (Shingles) Vaccine (1 of 2) Never done    Pneumococcal Vaccine (1 of 1 - PPSV23) Never done    Flu Vaccine (1) 09/01/2021    FALL RISK ASSESSMENT  10/26/2021     Activities of Daily Living    Your Health Risk Assessment indicates you have difficulties with activities of daily living such as housework, bathing, preparing meals, taking medication, etc. Please make a follow up appointment for us to address this issue in more detail.

## 2022-04-14 NOTE — PROGRESS NOTES
"SUBJECTIVE:   Margaret Healy is a 66 year old female who presents for Preventive Visit.    PATIENT ROOMED WITH  ID# [id number]    Patient has been advised of split billing requirements and indicates understanding: Yes  Are you in the first 12 months of your Medicare coverage?  No    Healthy Habits:     In general, how would you rate your overall health?  Good    Frequency of exercise:  4-5 days/week    Duration of exercise:  15-30 minutes    Do you usually eat at least 4 servings of fruit and vegetables a day, include whole grains    & fiber and avoid regularly eating high fat or \"junk\" foods?  Yes    Taking medications regularly:  Yes    Medication side effects:  None    Ability to successfully perform activities of daily living:  Transportation requires assistance    Home Safety:  No safety concerns identified    Hearing Impairment:  No hearing concerns    In the past 6 months, have you been bothered by leaking of urine?  No    In general, how would you rate your overall mental or emotional health?  Good      PHQ-2 Total Score: 0    Additional concerns today:  No    Do you feel safe in your environment? YES    Have you ever done Advance Care Planning? (For example, a Health Directive, POLST, or a discussion with a medical provider or your loved ones about your wishes):        Fall risk  Fallen 2 or more times in the past year?: No  Any fall with injury in the past year?: No    Cognitive Screening   1) Repeat 3 items (Leader, Season, Table)  Yes  2) Clock draw: NORMAL  3) 3 item recall: Recalls 3 objects  Results: NORMAL clock, 1-2 items recalled: COGNITIVE IMPAIRMENT LESS LIKELY    Mini-CogTM Copyright LEON Espinoza. Licensed by the author for use in [facility]; reprinted with permission (osmel@.[facility]). All rights reserved.      Do you have sleep apnea, excessive snoring or daytime drowsiness?: yes    Reviewed and updated as needed this visit by clinical staff   Tobacco  Allergies  Meds  Problems  " Med Hx  Surg Hx  Fam Hx            Reviewed and updated as needed this visit by Provider   Tobacco  Allergies  Meds  Problems  Med Hx  Surg Hx  Fam Hx           Social History     Tobacco Use     Smoking status: Never Smoker     Smokeless tobacco: Never Used   Substance Use Topics     Alcohol use: No     If you drink alcohol do you typically have >3 drinks per day or >7 drinks per week? No    Alcohol Use 4/14/2022   Prescreen: >3 drinks/day or >7 drinks/week? Not Applicable   Prescreen: >3 drinks/day or >7 drinks/week? -           Cold intolerance      Duration: last year and only in the winter not present in the warm weather     Description (location/character/radiation): cold hands     Intensity:  mild    Accompanying signs and symptoms: hair is thinning, patient reports that she gets startled frequently, but denies generalized anxiety. Patient feels that its associated with being cold    History (similar episodes/previous evaluation): None    Precipitating or alleviating factors: cold    Therapies tried and outcome: None    Patient denies fingers getting white or pale and painful in cold     Hyperlipidemia Follow-Up      Are you regularly taking any medication or supplement to lower your cholesterol?   Yes- statin    Are you having muscle aches or other side effects that you think could be caused by your cholesterol lowering medication?  No    Dizziness      Duration: a few months     Description   Feeling faint:  no   Feeling like the surroundings are moving: YES  Loss of consciousness or falls: no     Intensity:  mild    Accompanying signs and symptoms:   Nausea/vomitting: no   Palpitations: no   Weakness in arms or legs: no   Vision or speech changes: no   Ringing in ears (Tinnitus): no   Hearing loss related to dizziness: no   Other (fevers/chills/sweating/dyspnea): no     History (similar episodes/head trauma/previous evaluation/recent bleeding): None    Precipitating or alleviating factors (new  meds/chemicals): turning head  Worse with activity/head movement: YES    Therapies tried and outcome: TaiChi helps     Medication Followup of HC hemorrhoid cream     Taking Medication as prescribed: yes    Side Effects:  None    Medication Helping Symptoms:  yes     Concern:  Dry skin areas on the legs. currently left lower leg. Intermittent for many years. Patient wants a cream to help with it.     Current providers sharing in care for this patient include:   Patient Care Team:  No Ref-Primary, Physician as PCP - Olesya Romero PA-C as Assigned PCP    The following health maintenance items are reviewed in Epic and correct as of today:  Health Maintenance Due   Topic Date Due     COVID-19 Vaccine (1) Never done     ZOSTER IMMUNIZATION (1 of 2) Never done     Pneumococcal Vaccine: 65+ Years (1 of 1 - PPSV23) Never done     INFLUENZA VACCINE (1) 09/01/2021     FALL RISK ASSESSMENT  10/26/2021     Patient Active Problem List   Diagnosis     Combined forms of age-related cataract of both eyes     Meibomian gland disease, unspecified laterality     Mixed hyperlipidemia     Impaired fasting glucose     Dermatochalasis of both upper eyelids     Brow ptosis     Benign paroxysmal positional vertigo of right ear     Age-related osteoporosis without current pathological fracture     Past Surgical History:   Procedure Laterality Date     BLEPHAROPLASTY Right 12/30/2019    Procedure: Right upper eyelid blepharoplasty;  Surgeon: Sanket Bello MD;  Location:  OR       Social History     Tobacco Use     Smoking status: Never Smoker     Smokeless tobacco: Never Used   Substance Use Topics     Alcohol use: No     Family History   Problem Relation Age of Onset     Cataracts Mother      Hypertension Mother      Cancer Father      Glaucoma No family hx of      Macular Degeneration No family hx of          Current Outpatient Medications   Medication Sig Dispense Refill     alendronate (FOSAMAX) 70 MG tablet Take 1  tablet (70 mg) by mouth every 7 days 12 tablet 4     atorvastatin (LIPITOR) 20 MG tablet Take 1 tablet (20 mg) by mouth daily 90 tablet 3     hydrocortisone, Perianal, (HYDROCORTISONE) 2.5 % cream Place rectally 2 times daily as needed for hemorrhoids 30 g 3     triamcinolone (KENALOG) 0.1 % external cream Apply topically 2 times daily 80 g 1     biotin 2.5 mg/mL SUSP Take by mouth daily       COLLAGEN PO        erythromycin (ROMYCIN) 5 MG/GM ophthalmic ointment Apply a thin strip to the incisions 3 times a day (Patient not taking: Reported on 10/26/2020) 1 Tube 0     hydrocortisone (ANUSOL-HC) 2.5 % cream Place rectally 2 times daily as needed for hemorrhoids 30 g 1     Omega-3 Fatty Acids (FISH OIL PO)        UNABLE TO FIND MEDICATION NAME: [employer] Women's 2 gummy's       VITAMIN D, CHOLECALCIFEROL, PO Take by mouth daily       Allergies   Allergen Reactions     B1-Ca      No Clinical Screening - See Comments Other (See Comments)     Vitamin B1 - feels like she is going to pass out when taking this medication     Pneumonia Vaccine:Adults age 65+ who have not received previous Pneumovax (PPSV23) or PCV13 as an adult: Should first be given PCV13 AND then should be given PPSV23 6-12 months after PCV13  Mammogram Screening: Mammogram Screening: Recommended mammography every 1-2 years with patient discussion and risk factor consideration    FHS-7:   Breast CA Risk Assessment (FHS-7) 4/11/2022 4/14/2022   Did any of your first-degree relatives have breast or ovarian cancer? No No   Did any of your relatives have bilateral breast cancer? No No   Did any man in your family have breast cancer? No No   Did any woman in your family have breast and ovarian cancer? No No   Did any woman in your family have breast cancer before age 50 y? No No   Do you have 2 or more relatives with breast and/or ovarian cancer? No No   Do you have 2 or more relatives with breast and/or bowel cancer? No No       Mammogram Screening:  "Recommended mammography every 1-2 years with patient discussion and risk factor consideration  Pertinent mammograms are reviewed under the imaging tab.    Review of Systems   Constitutional: Negative for chills and fever.   HENT: Negative for congestion, ear pain, hearing loss and sore throat.    Eyes: Negative for pain and visual disturbance.   Respiratory: Negative for cough and shortness of breath.    Cardiovascular: Negative for chest pain, palpitations and peripheral edema.   Gastrointestinal: Positive for heartburn. Negative for abdominal pain, constipation, diarrhea, hematochezia and nausea.   Breasts:  Negative for tenderness, breast mass and discharge.   Genitourinary: Negative for dysuria, frequency, genital sores, hematuria, pelvic pain, urgency, vaginal bleeding and vaginal discharge.   Musculoskeletal: Positive for myalgias. Negative for arthralgias and joint swelling.   Skin: Negative for rash.   Neurological: Negative for dizziness, weakness, headaches and paresthesias.   Psychiatric/Behavioral: Negative for mood changes. The patient is nervous/anxious.      Constitutional, HEENT, cardiovascular, pulmonary, gi and gu systems are negative, except as otherwise noted.    OBJECTIVE:   /74   Pulse 73   Temp 97.1  F (36.2  C) (Tympanic)   Resp 16   Ht 1.511 m (4' 11.5\")   Wt 48.1 kg (106 lb)   LMP  (LMP Unknown)   SpO2 100%   BMI 21.05 kg/m   Estimated body mass index is 21.05 kg/m  as calculated from the following:    Height as of this encounter: 1.511 m (4' 11.5\").    Weight as of this encounter: 48.1 kg (106 lb).  Physical Exam  GENERAL APPEARANCE: healthy, alert and no distress  EYES: Eyes grossly normal to inspection, PERRL and conjunctivae and sclerae normal  HENT: ear canals and TM's normal, nose and mouth without ulcers or lesions, oropharynx clear and oral mucous membranes moist  NECK: no adenopathy, no asymmetry, masses, or scars and thyroid normal to palpation  RESP: lungs clear to " auscultation - no rales, rhonchi or wheezes  BREAST: normal without masses, tenderness or nipple discharge and no palpable axillary masses or adenopathy  CV: regular rate and rhythm, normal S1 S2, no S3 or S4, no murmur, click or rub, no peripheral edema and peripheral pulses strong  ABDOMEN: soft, nontender, no hepatosplenomegaly, no masses and bowel sounds normal  MS: no musculoskeletal defects are noted and gait is age appropriate without ataxia  SKIN: dry skin patch on the left shin, no other suspicious lesions or rashes  NEURO: Normal strength and tone, sensory exam grossly normal, mentation intact and speech normal  PSYCH: mentation appears normal and affect normal/bright    Diagnostic Test Results:  Labs reviewed in Epic    ASSESSMENT / PLAN:   Diagnoses and all orders for this visit:    Encounter for Medicare annual wellness exam    Mixed hyperlipidemia  -     atorvastatin (LIPITOR) 20 MG tablet; Take 1 tablet (20 mg) by mouth daily  -     Lipid Profile (Chol, Trig, HDL, LDL calc); Future  -     Comprehensive metabolic panel (BMP + Alb, Alk Phos, ALT, AST, Total. Bili, TP); Future    Age-related osteoporosis without current pathological fracture  -     REVIEW OF HEALTH MAINTENANCE PROTOCOL ORDERS  -     DX Hip/Pelvis/Spine; Future  -     alendronate (FOSAMAX) 70 MG tablet; Take 1 tablet (70 mg) by mouth every 7 days    Benign paroxysmal positional vertigo of right ear    Eczema, unspecified type  -     triamcinolone (KENALOG) 0.1 % external cream; Apply topically 2 times daily    External hemorrhoids  -     hydrocortisone, Perianal, (HYDROCORTISONE) 2.5 % cream; Place rectally 2 times daily as needed for hemorrhoids    Encounter for screening mammogram for breast cancer  -     *MA Screening Digital Bilateral; Future    Cold intolerance  -     CBC with platelets; Future  -     TSH with free T4 reflex; Future    Impaired fasting glucose  -     Hemoglobin A1c; Future    Other orders  -     Vitamin D Deficiency;  "Future  -     Cancel: Pneumococcal vaccine 13 valent PCV13 IM (Prevnar) [11035]      Continue current medications  Call to schedule mammogram and Dexa scan     Cold intolerance is most likely age related. Patient was advised to dress warmer, wear gloved  BPV is mild, patient declined medications or therapy at this time   She is doing Ti -Chi and it helps     Apply triamcinolone cream twice a day as needed to the dry patches on the leg  Continue HC cream for hemorrhoids as needed   Patient has been advised of split billing requirements and indicates understanding: Yes    COUNSELING:  Reviewed preventive health counseling, as reflected in patient instructions       Immunizations      Declined : Covid and Pneumococcal  Vaccines          Osteoporosis prevention/bone health  Hearing/vision screens -patient declined, no issues     Estimated body mass index is 21.05 kg/m  as calculated from the following:    Height as of this encounter: 1.511 m (4' 11.5\").    Weight as of this encounter: 48.1 kg (106 lb).        She reports that she has never smoked. She has never used smokeless tobacco.      Appropriate preventive services were discussed with this patient, including applicable screening as appropriate for cardiovascular disease, diabetes, osteopenia/osteoporosis, and glaucoma.  As appropriate for age/gender, discussed screening for colorectal cancer, prostate cancer, breast cancer, and cervical cancer. Checklist reviewing preventive services available has been given to the patient.    Reviewed patients plan of care and provided an AVS. The Basic Care Plan (routine screening as documented in Health Maintenance) for Margaret meets the Care Plan requirement. This Care Plan has been established and reviewed with the Patient.    Counseling Resources:  ATP IV Guidelines  Pooled Cohorts Equation Calculator  Breast Cancer Risk Calculator  Breast Cancer: Medication to Reduce Risk  FRAX Risk Assessment  ICSI Preventive " Guidelines  Dietary Guidelines for Americans, 2010  USDA's MyPlate  ASA Prophylaxis  Lung CA Screening    Olesya Heredia PA-C  [facility]

## 2022-04-15 LAB — DEPRECATED CALCIDIOL+CALCIFEROL SERPL-MC: 44 UG/L (ref 20–75)

## 2022-04-18 LAB — HCV AB SERPL QL IA: NONREACTIVE

## 2022-04-19 LAB
HBV SURFACE AB SERPL IA-ACNC: >1000 M[IU]/ML
HBV SURFACE AG SERPL QL IA: NONREACTIVE

## 2022-05-04 ENCOUNTER — ANCILLARY PROCEDURE (OUTPATIENT)
Dept: ULTRASOUND IMAGING | Facility: CLINIC | Age: 67
End: 2022-05-04
Attending: PHYSICIAN ASSISTANT
Payer: COMMERCIAL

## 2022-05-04 DIAGNOSIS — R74.8 ELEVATED LIVER ENZYMES: ICD-10-CM

## 2022-05-04 PROCEDURE — 76705 ECHO EXAM OF ABDOMEN: CPT | Mod: TC | Performed by: RADIOLOGY

## 2022-05-16 ENCOUNTER — ANCILLARY PROCEDURE (OUTPATIENT)
Dept: BONE DENSITY | Facility: CLINIC | Age: 67
End: 2022-05-16
Attending: PHYSICIAN ASSISTANT
Payer: COMMERCIAL

## 2022-05-16 DIAGNOSIS — M81.0 AGE-RELATED OSTEOPOROSIS WITHOUT CURRENT PATHOLOGICAL FRACTURE: ICD-10-CM

## 2022-05-16 PROCEDURE — 77081 DXA BONE DENSITY APPENDICULR: CPT | Mod: 59 | Performed by: RADIOLOGY

## 2022-05-16 PROCEDURE — 77080 DXA BONE DENSITY AXIAL: CPT | Performed by: RADIOLOGY

## 2022-05-19 DIAGNOSIS — K82.8 GALLBLADDER SLUDGE: ICD-10-CM

## 2022-05-19 DIAGNOSIS — R74.8 ELEVATED LIVER ENZYMES: Primary | ICD-10-CM

## 2022-09-17 ENCOUNTER — HEALTH MAINTENANCE LETTER (OUTPATIENT)
Age: 67
End: 2022-09-17

## 2023-04-18 ENCOUNTER — OFFICE VISIT (OUTPATIENT)
Dept: FAMILY MEDICINE | Facility: CLINIC | Age: 68
End: 2023-04-18
Payer: COMMERCIAL

## 2023-04-18 VITALS
OXYGEN SATURATION: 100 % | SYSTOLIC BLOOD PRESSURE: 124 MMHG | RESPIRATION RATE: 14 BRPM | HEIGHT: 59 IN | BODY MASS INDEX: 22.58 KG/M2 | WEIGHT: 112 LBS | TEMPERATURE: 98.1 F | HEART RATE: 73 BPM | DIASTOLIC BLOOD PRESSURE: 75 MMHG

## 2023-04-18 DIAGNOSIS — Z00.00 ENCOUNTER FOR MEDICARE ANNUAL WELLNESS EXAM: Primary | ICD-10-CM

## 2023-04-18 DIAGNOSIS — R74.8 ELEVATED LIVER ENZYMES: ICD-10-CM

## 2023-04-18 DIAGNOSIS — E78.2 MIXED HYPERLIPIDEMIA: ICD-10-CM

## 2023-04-18 DIAGNOSIS — Z12.31 ENCOUNTER FOR SCREENING MAMMOGRAM FOR BREAST CANCER: ICD-10-CM

## 2023-04-18 DIAGNOSIS — M81.0 AGE-RELATED OSTEOPOROSIS WITHOUT CURRENT PATHOLOGICAL FRACTURE: ICD-10-CM

## 2023-04-18 DIAGNOSIS — L30.9 ECZEMA, UNSPECIFIED TYPE: ICD-10-CM

## 2023-04-18 DIAGNOSIS — K64.4 EXTERNAL HEMORRHOIDS: ICD-10-CM

## 2023-04-18 DIAGNOSIS — Z23 ENCOUNTER FOR IMMUNIZATION: ICD-10-CM

## 2023-04-18 DIAGNOSIS — M25.571 ARTHRALGIA OF RIGHT FOOT: ICD-10-CM

## 2023-04-18 PROCEDURE — G0438 PPPS, INITIAL VISIT: HCPCS | Performed by: PREVENTIVE MEDICINE

## 2023-04-18 PROCEDURE — 90471 IMMUNIZATION ADMIN: CPT | Performed by: PREVENTIVE MEDICINE

## 2023-04-18 PROCEDURE — 80061 LIPID PANEL: CPT | Performed by: PREVENTIVE MEDICINE

## 2023-04-18 PROCEDURE — 80053 COMPREHEN METABOLIC PANEL: CPT | Performed by: PREVENTIVE MEDICINE

## 2023-04-18 PROCEDURE — 36415 COLL VENOUS BLD VENIPUNCTURE: CPT | Performed by: PREVENTIVE MEDICINE

## 2023-04-18 PROCEDURE — 99214 OFFICE O/P EST MOD 30 MIN: CPT | Mod: 25 | Performed by: PREVENTIVE MEDICINE

## 2023-04-18 PROCEDURE — 90677 PCV20 VACCINE IM: CPT | Performed by: PREVENTIVE MEDICINE

## 2023-04-18 RX ORDER — TRIAMCINOLONE ACETONIDE 1 MG/G
CREAM TOPICAL 2 TIMES DAILY
Qty: 80 G | Refills: 1 | Status: SHIPPED | OUTPATIENT
Start: 2023-04-18 | End: 2024-04-19

## 2023-04-18 RX ORDER — VITAMIN E 268 MG
CAPSULE ORAL DAILY
COMMUNITY
End: 2024-04-19

## 2023-04-18 RX ORDER — HYDROCORTISONE 25 MG/G
CREAM TOPICAL 2 TIMES DAILY PRN
Qty: 30 G | Refills: 3 | Status: SHIPPED | OUTPATIENT
Start: 2023-04-18 | End: 2024-04-19

## 2023-04-18 RX ORDER — ALENDRONATE SODIUM 70 MG/1
70 TABLET ORAL
Qty: 12 TABLET | Refills: 4 | Status: SHIPPED | OUTPATIENT
Start: 2023-04-18 | End: 2024-04-19

## 2023-04-18 RX ORDER — ATORVASTATIN CALCIUM 20 MG/1
20 TABLET, FILM COATED ORAL DAILY
Qty: 90 TABLET | Refills: 3 | Status: SHIPPED | OUTPATIENT
Start: 2023-04-18 | End: 2024-04-19 | Stop reason: SINTOL

## 2023-04-18 ASSESSMENT — ENCOUNTER SYMPTOMS
DIARRHEA: 0
MYALGIAS: 0
COUGH: 0
EYE PAIN: 0
PARESTHESIAS: 0
ARTHRALGIAS: 0
ABDOMINAL PAIN: 0
HEARTBURN: 0
JOINT SWELLING: 0
FEVER: 0
FREQUENCY: 0
DIZZINESS: 0
DYSURIA: 0
CHILLS: 0
PALPITATIONS: 0
CONSTIPATION: 0
NAUSEA: 0
BREAST MASS: 0
SORE THROAT: 0
HEMATOCHEZIA: 0
NERVOUS/ANXIOUS: 0
SHORTNESS OF BREATH: 0
WEAKNESS: 0
HEMATURIA: 0
HEADACHES: 0

## 2023-04-18 ASSESSMENT — ACTIVITIES OF DAILY LIVING (ADL): CURRENT_FUNCTION: TELEPHONE REQUIRES ASSISTANCE

## 2023-04-18 NOTE — NURSING NOTE
Prior to immunization administration, verified patients identity using patient s name and date of birth. Please see Immunization Activity for additional information.     Screening Questionnaire for Adult Immunization    Are you sick today?   No   Do you have allergies to medications, food, a vaccine component or latex?   No   Have you ever had a serious reaction after receiving a vaccination?   No   Do you have a long-term health problem with heart, lung, kidney, or metabolic disease (e.g., diabetes), asthma, a blood disorder, no spleen, complement component deficiency, a cochlear implant, or a spinal fluid leak?  Are you on long-term aspirin therapy?   No   Do you have cancer, leukemia, HIV/AIDS, or any other immune system problem?   No   Do you have a parent, brother, or sister with an immune system problem?   No   In the past 3 months, have you taken medications that affect  your immune system, such as prednisone, other steroids, or anticancer drugs; drugs for the treatment of rheumatoid arthritis, Crohn s disease, or psoriasis; or have you had radiation treatments?   No   Have you had a seizure, or a brain or other nervous system problem?   No   During the past year, have you received a transfusion of blood or blood    products, or been given immune (gamma) globulin or antiviral drug?   No   For women: Are you pregnant or is there a chance you could become       pregnant during the next month?   No   Have you received any vaccinations in the past 4 weeks?   No     Immunization questionnaire answers were all negative.      Injection of Pcv20 given by Law Funez MA. Patient instructed to remain in clinic for 15 minutes afterwards, and to report any adverse reactions.     Screening performed by Law Funez MA on 4/18/2023 at 4:16 PM.

## 2023-04-18 NOTE — PATIENT INSTRUCTIONS
Patient Education   Personalized Prevention Plan  You are due for the preventive services outlined below.  Your care team is available to assist you in scheduling these services.  If you have already completed any of these items, please share that information with your care team to update in your medical record.  Health Maintenance Due   Topic Date Due     COVID-19 Vaccine (1) Never done     Zoster (Shingles) Vaccine (1 of 2) Never done     Flu Vaccine (1) 09/01/2022       Exercise for a Healthier Heart  You may wonder how you can improve the health of your heart. If you re thinking about exercise, you re on the right track. You don t need to become an athlete. But you do need a certain amount of brisk exercise to help strengthen your heart. If you have been diagnosed with a heart condition, your healthcare provider may advise exercise to help your condition. To help make exercise a habit, choose safe, fun activities.      Exercise with a friend. When activity is fun, you're more likely to stick with it.     Before you start  Check with your healthcare provider before starting an exercise program. This is especially important if you haven't been active for a while. It's also important if you have a long-term (chronic) health problem such as heart disease, diabetes, or obesity. Also check with your provider if you're at high risk for having these problems.   Why exercise?  Exercising regularly offers many healthy rewards. It can help you do all of these:     Improve your blood cholesterol level to help prevent further heart trouble.    Lower your blood pressure to help prevent a stroke or heart attack.    Control diabetes or reduce your risk of getting this disease.    Improve your heart and lung function.    Reach and stay at a healthy weight.    Make your muscles stronger so you can stay active.    Prevent falls and fractures by slowing the loss of bone mass (osteoporosis).    Manage stress better.    Improve  your sense of self and your body image.  Exercise tips      Ease into your routine. Set small goals. Then build on them. Talk with your healthcare provider first before starting an exercise routine if you're not sure what your activity level should be.    Exercise on most days. Aim for a total of at least 150 minutes (2 hours and 30 minutes) or more of moderate-intensity aerobic activity each week. You could also do 75 minutes (1 hour and 15 minutes) or more of vigorous-intensity aerobic activity each week. Or try for a combination of both. Moderate activity means that you breathe heavier and your heart rate increases, but you can still talk. Think about doing at least 30 minutes of moderate exercise, 5 times a week. It's OK to work up to the 30-minute period over time. Examples of moderate-intensity activity are brisk walking, gardening, and water aerobics.    Step up your daily activity level.  Along with your exercise program, try being more active the whole day. Walk instead of drive. Or park further away so that you take more steps each day. Do more household tasks or yard work. You may not be able to meet the advised amount of physical activity. But doing some moderate- or vigorous-intensity aerobic activity can help reduce your risk for heart disease. Your healthcare provider can help you figure out what is best for you.    Choose 1 or more activities you enjoy.  Walking is one of the easiest things you can do. You can also try swimming, riding a bike, dancing, or taking an exercise class.    Call 911  Call 911 right away if any of these occur:     Chest pain that doesn't go away quickly with rest    New burning, tightness, pressure, or heaviness in your chest, neck, shoulders, back, or arms    Abnormal or severe shortness of breath    A very fast or irregular heartbeat (palpitations)    Fainting  When to call your healthcare provider  Call your healthcare provider if you have any of these:     Dizziness or  lightheadedness    Mild shortness of breath or chest pain    Increased or new joint or muscle pain    [employer] last reviewed this educational content on 7/1/2022 2000-2022 [employer]. All rights reserved. This information is not intended as a substitute for professional medical care. Always follow your healthcare professional's instructions.          Understanding USDA MyPlate  The USDA has guidelines to help you make healthy food choices. These are called MyPlate. MyPlate shows the food groups that make up healthy meals using the image of a place setting. Before you eat, think about the healthiest choices for what to put on your plate or in your cup or bowl. To learn more about building a healthy plate, visit www.choosemyplate.gov.     The food groups    Fruits. Any fruit or 100% fruit juice counts as part of the Fruit Group. Fruits may be fresh, canned, frozen, or dried, and may be whole, cut-up, or pureed. Make 1/2 of your plate fruits and vegetables.    Vegetables. Any vegetable or 100% vegetable juice counts as a member of the Vegetable Group. Vegetables may be fresh, frozen, canned, or dried. They can be served raw or cooked and may be whole, cut-up, or mashed. Make 1/2 of your plate fruits and vegetables.    Grains. All foods made from grains are part of the Grains Group. These include wheat, rice, oats, cornmeal, and barley. Grains are often used to make foods such as bread, pasta, oatmeal, cereal, tortillas, and grits. Grains should be no more than 1/4 of your plate. At least half of your grains should be whole grains.    Protein. This group includes meat, poultry, seafood, beans and peas, eggs, processed soy products (such as tofu), nuts (including nut butters), and seeds. Make protein choices no more than 1/4 of your plate. Meat and poultry choices should be lean or low fat.    Dairy. The Dairy Group includes all fluid milk products and foods made from milk that contain calcium, such as  yogurt and cheese. (Foods that have little calcium, such as cream, butter, and cream cheese, are not part of this group.) Most dairy choices should be low-fat or fat-free.    Oils. Oils aren't a food group, but they do contain essential nutrients. However it's important to watch your intake of oils. These are fats that are liquid at room temperature. They include canola, corn, olive, soybean, vegetable, and sunflower oil. Foods that are mainly oil include mayonnaise, certain salad dressings, and soft margarines. You likely already get your daily oil allowance from the foods you eat.  Things to limit  Eating healthy also means limiting these things in your diet:    Salt (sodium). Many processed foods have a lot of sodium. To keep sodium intake down, eat fresh vegetables, meats, poultry, and seafood when possible. Purchase low-sodium, reduced-sodium, or no-salt-added food products at the store. And don't add salt to your meals at home. Instead, season them with herbs and spices such as dill, oregano, cumin, and paprika. Or try adding flavor with lemon or lime zest and juice.    Saturated fat. Saturated fats are most often found in animal products such as beef, pork, and chicken. They are often solid at room temperature, such as butter. To reduce your saturated fat intake, choose leaner cuts of meat and poultry. And try healthier cooking methods such as grilling, broiling, roasting, or baking. For a simple lower-fat swap, use plain nonfat yogurt instead of mayonnaise when making potato salad or macaroni salad.    Added sugars. These are sugars added to foods. They are in foods such as ice cream, candy, soda, fruit drinks, sports drinks, energy drinks, cookies, pastries, jams, and syrups. Cut down on added sugars by sharing sweet treats with a family member or friend. You can also choose fruit for dessert, and drink water or other unsweetened beverages.  [employer] last reviewed this educational content on 6/1/2020     9809-4691 [employer]. All rights reserved. This information is not intended as a substitute for professional medical care. Always follow your healthcare professional's instructions.        Activities of Daily Living    Your Health Risk Assessment indicates you have difficulties with activities of daily living such as housework, bathing, preparing meals, taking medication, etc. Please make a follow up appointment for us to address this issue in more detail.    Signs of Hearing Loss  Hearing loss is a problem shared by many people. In fact, it's one of the most common health problems, particularly as people age. Most people aged 65 and older have some hearing loss. By age 80, almost everyone does. Hearing loss often occurs slowly over the years. So, you may not realize your hearing has gotten worse.   When sudden hearing loss occurs, it's important to contact your healthcare provider right away. Your provider will do a medical exam and a hearing exam as soon as possible. This is to help find the cause and type of your sudden hearing loss. Based on your diagnosis, your healthcare provider will discuss possible treatments.      Hearing much better with one ear can be a sign of hearing loss.     Have your hearing checked  Call your healthcare provider if you:     Have to strain to hear normal conversation    Have to watch other people s faces very carefully to follow what they re saying    Need to ask people to repeat what they ve said    Often misunderstand what people are saying    Turn the volume of the television or radio up so high that others complain    Feel that people are mumbling when they re talking to you    Find that the effort to hear leaves you feeling tired and irritated    Notice, when using the phone, that you hear better with one ear than the other  [employer] last reviewed this educational content on 6/1/2022 2000-2022 [employer]. All rights reserved. This information is  not intended as a substitute for professional medical care. Always follow your healthcare professional's instructions.

## 2023-04-18 NOTE — PROGRESS NOTES
"SUBJECTIVE:   Margaret is a 67 year old who presents for Preventive Visit.      4/18/2023     3:25 PM   Additional Questions   Roomed by mady   Accompanied by self         4/18/2023     3:25 PM   Patient Reported Additional Medications   Patient reports taking the following new medications none   Patient has been advised of split billing requirements and indicates understanding: Yes  Are you in the first 12 months of your Medicare coverage?  No      Visit done with Telephone [place] interpretor     Healthy Habits:     In general, how would you rate your overall health?  Good    Frequency of exercise:  None    Do you usually eat at least 4 servings of fruit and vegetables a day, include whole grains    & fiber and avoid regularly eating high fat or \"junk\" foods?  No    Taking medications regularly:  No    Barriers to taking medications:  Problems remembering to take them    Medication side effects:  None    Ability to successfully perform activities of daily living:  Telephone requires assistance    Home Safety:  No safety concerns identified    Hearing Impairment:  Difficulty understanding soft or whispered speech and difficulty understanding speech on the telephone    In the past 6 months, have you been bothered by leaking of urine?  No    In general, how would you rate your overall mental or emotional health?  Excellent      PHQ-2 Total Score: 0    Additional concerns today:  No      Have you ever done Advance Care Planning? (For example, a Health Directive, POLST, or a discussion with a medical provider or your loved ones about your wishes): No, advance care planning information given to patient to review.  Patient declined advance care planning discussion at this time.      Fall risk  Fallen 2 or more times in the past year?: No  Any fall with injury in the past year?: No    Cognitive Screening   1) Repeat 3 items (Leader, Season, Table)    2) Clock draw: NORMAL  3) 3 item recall: Recalls 3 objects  Results: 3 " items recalled: COGNITIVE IMPAIRMENT LESS LIKELY    Mini-CogTM Copyright LEON Espinoza. Licensed by the author for use in [facility]; reprinted with permission (osmel@[facility]). All rights reserved.      Do you have sleep apnea, excessive snoring or daytime drowsiness?: no    Reviewed and updated as needed this visit by clinical staff   Tobacco  Allergies  Meds  Problems  Med Hx  Surg Hx  Fam Hx          Reviewed and updated as needed this visit by Provider   Tobacco  Allergies  Meds  Problems  Med Hx  Surg Hx  Fam Hx         Social History     Tobacco Use     Smoking status: Never     Smokeless tobacco: Never   Vaping Use     Vaping status: Not on file   Substance Use Topics     Alcohol use: No             4/18/2023    12:38 PM   Alcohol Use   Prescreen: >3 drinks/day or >7 drinks/week? No     Do you have a current opioid prescription? No  Do you use any other controlled substances or medications that are not prescribed by a provider? None      Hyperlipidemia Follow-Up      Are you regularly taking any medication or supplement to lower your cholesterol?   Yes- statin    Are you having muscle aches or other side effects that you think could be caused by your cholesterol lowering medication?  No      Foot pain:  -right side  -few months  -no trauma  -on top of the foot  -symptoms for about 5-6 months  -had a fall+  -no edema  -no redness  -No tingling or numbness  -pain more with walking  -no bruising   -trying to do some stretching    Osteoporosis:  -taking medication     Requesting refills on medication.     Current providers sharing in care for this patient include:   Patient Care Team:  No Ref-Primary, Physician as PCP - Olesya Romero PA-C as Assigned PCP    The following health maintenance items are reviewed in Epic and correct as of today:  Health Maintenance   Topic Date Due     COVID-19 Vaccine (1) Never done     ZOSTER IMMUNIZATION (1 of 2) Never done     Pneumococcal  Vaccine: 65+ Years (1 - PCV) Never done     INFLUENZA VACCINE (1) 09/01/2022     MEDICARE ANNUAL WELLNESS VISIT  04/14/2023     ANNUAL REVIEW OF HM ORDERS  04/18/2024     FALL RISK ASSESSMENT  04/18/2024     MAMMO SCREENING  05/16/2024     DTAP/TDAP/TD IMMUNIZATION (3 - Td or Tdap) 01/15/2025     COLORECTAL CANCER SCREENING  12/27/2026     LIPID  04/14/2027     ADVANCE CARE PLANNING  04/18/2028     DEXA  05/16/2037     HEPATITIS C SCREENING  Completed     PHQ-2 (once per calendar year)  Completed     IPV IMMUNIZATION  Aged Out     MENINGITIS IMMUNIZATION  Aged Out     PAP  Discontinued     Lab work is in process  Labs reviewed in EPIC  BP Readings from Last 3 Encounters:   04/18/23 124/75   04/14/22 135/74   10/26/20 133/74    Wt Readings from Last 3 Encounters:   04/18/23 50.8 kg (112 lb)   04/14/22 48.1 kg (106 lb)   10/26/20 48.1 kg (106 lb)                  Patient Active Problem List   Diagnosis     Combined forms of age-related cataract of both eyes     Meibomian gland disease, unspecified laterality     Mixed hyperlipidemia     Impaired fasting glucose     Dermatochalasis of both upper eyelids     Brow ptosis     Benign paroxysmal positional vertigo of right ear     Age-related osteoporosis without current pathological fracture     Past Surgical History:   Procedure Laterality Date     BLEPHAROPLASTY Right 12/30/2019    Procedure: Right upper eyelid blepharoplasty;  Surgeon: Sanket Bello MD;  Location:  OR       Social History     Tobacco Use     Smoking status: Never     Smokeless tobacco: Never   Vaping Use     Vaping status: Not on file   Substance Use Topics     Alcohol use: No     Family History   Problem Relation Age of Onset     Cataracts Mother      Hypertension Mother      Cancer Father      Glaucoma No family hx of      Macular Degeneration No family hx of          Current Outpatient Medications   Medication Sig Dispense Refill     alendronate (FOSAMAX) 70 MG tablet Take 1 tablet (70 mg) by  mouth every 7 days 12 tablet 4     atorvastatin (LIPITOR) 20 MG tablet Take 1 tablet (20 mg) by mouth daily 90 tablet 3     hydrocortisone, Perianal, (HYDROCORTISONE) 2.5 % cream Place rectally 2 times daily as needed for hemorrhoids 30 g 3     Multiple Vitamins-Minerals (HAIR SKIN AND NAILS FORMULA) TABS        Omega-3 Fatty Acids (FISH OIL PO)        triamcinolone (KENALOG) 0.1 % external cream Apply topically 2 times daily 80 g 1     VITAMIN D, CHOLECALCIFEROL, PO Take 5,000 Units by mouth daily       vitamin E (TOCOPHEROL) 400 units (180 mg) capsule Take by mouth daily       Allergies   Allergen Reactions     B1-Ca      No Clinical Screening - See Comments Other (See Comments)     Vitamin B1 - feels like she is going to pass out when taking this medication       FHS-7:       4/11/2022     9:17 PM 4/14/2022     1:33 PM 5/16/2022     2:20 PM 4/18/2023    12:40 PM   Breast CA Risk Assessment (FHS-7)   Did any of your first-degree relatives have breast or ovarian cancer? No No No No   Did any of your relatives have bilateral breast cancer? No No No No   Did any man in your family have breast cancer? No No No No   Did any woman in your family have breast and ovarian cancer? No No No No   Did any woman in your family have breast cancer before age 50 y? No No No No   Do you have 2 or more relatives with breast and/or ovarian cancer? No No No No   Do you have 2 or more relatives with breast and/or bowel cancer? No No No No       Mammogram Screening: Recommended mammography every 1-2 years with patient discussion and risk factor consideration  Pertinent mammograms are reviewed under the imaging tab.    Review of Systems   Constitutional: Negative for chills and fever.   HENT: Negative for congestion, ear pain, hearing loss and sore throat.    Eyes: Negative for pain and visual disturbance.   Respiratory: Negative for cough and shortness of breath.    Cardiovascular: Negative for chest pain, palpitations and peripheral  "edema.   Gastrointestinal: Negative for abdominal pain, constipation, diarrhea, heartburn, hematochezia and nausea.   Breasts:  Negative for tenderness, breast mass and discharge.   Genitourinary: Negative for dysuria, frequency, genital sores, hematuria, pelvic pain, urgency, vaginal bleeding and vaginal discharge.   Musculoskeletal: Negative for arthralgias, joint swelling and myalgias.   Skin: Negative for rash.   Neurological: Negative for dizziness, weakness, headaches and paresthesias.   Psychiatric/Behavioral: Negative for mood changes. The patient is not nervous/anxious.        OBJECTIVE:   /75 (BP Location: Left arm, Patient Position: Sitting, Cuff Size: Adult Regular)   Pulse 73   Temp 98.1  F (36.7  C) (Oral)   Resp 14   Ht 1.499 m (4' 11\")   Wt 50.8 kg (112 lb)   LMP  (LMP Unknown)   SpO2 100%   BMI 22.62 kg/m   Estimated body mass index is 22.62 kg/m  as calculated from the following:    Height as of this encounter: 1.499 m (4' 11\").    Weight as of this encounter: 50.8 kg (112 lb).  Physical Exam  GENERAL APPEARANCE: healthy, alert and no distress  EYES: Eyes grossly normal to inspection and conjunctivae and sclerae normal  HENT: Intact TMs  NECK: no adenopathy and trachea midline and normal to palpation  RESP: lungs clear to auscultation - no rales, rhonchi or wheezes  CV: regular rates and rhythm, normal S1 S2, no S3 or S4 and no murmur, click or rub  ABDOMEN: soft, non-tender and no rebound or guarding   MS: extremities normal- no gross deformities noted and peripheral pulses normal  SKIN: no suspicious lesions or rashes  NEURO: Normal strength and tone, mentation intact and speech normal, gait normal, able to heel and toe walk   PSYCH: mentation appears normal  Right foot and ankle: no gross deformities, strength and range of motion intact, neurovascular intact, no edema, no rash, no bruising, bunion+, slight tenderness on the anterior aspect of the foot. No tenderness over the " emmanuel       Diagnostic Test Results:  Labs reviewed in Epic  No results found for this or any previous visit (from the past 24 hour(s)).    ASSESSMENT / PLAN:   Margaret was seen today for physical.    Diagnoses and all orders for this visit:    Encounter for Medicare annual wellness exam  -     REVIEW OF HEALTH MAINTENANCE PROTOCOL ORDERS    Mixed hyperlipidemia  -     Comprehensive metabolic panel; Future  -     Lipid panel reflex to direct LDL Non-fasting; Future  -     atorvastatin (LIPITOR) 20 MG tablet; Take 1 tablet (20 mg) by mouth daily    Encounter for screening mammogram for breast cancer  -     *MA Screening Digital Bilateral; Future    Elevated liver enzymes  -     Comprehensive metabolic panel; Future  -await labs    Age-related osteoporosis without current pathological fracture  -     Comprehensive metabolic panel; Future  -     alendronate (FOSAMAX) 70 MG tablet; Take 1 tablet (70 mg) by mouth every 7 days  -DEXA done 5/16/22    Arthralgia of right foot  -     XR Foot Right G/E 3 Views; Future  -differentials considered include foot sprain, osteoarthritis, fractures.     Encounter for immunization  -     Pneumococcal 20 Valent Conjugate (PCV20)    External hemorrhoids  -     hydrocortisone, Perianal, (HYDROCORTISONE) 2.5 % cream; Place rectally 2 times daily as needed for hemorrhoids    Eczema, unspecified type  -     triamcinolone (KENALOG) 0.1 % external cream; Apply topically 2 times daily  -refill provided  -use for a maximum of 2 weeks  -risk of long term topical steroid discussed including atrophy and pigment changes       COUNSELING:  Reviewed preventive health counseling, as reflected in patient instructions       Regular exercise       Healthy diet/nutrition       Vision screening       Fall risk prevention       Immunizations    Vaccinated for: Pneumococcal             Osteoporosis prevention/bone health        She reports that she has never smoked. She has never used smokeless  tobacco.      Appropriate preventive services were discussed with this patient, including applicable screening as appropriate for cardiovascular disease, diabetes, osteopenia/osteoporosis, and glaucoma.  As appropriate for age/gender, discussed screening for colorectal cancer, prostate cancer, breast cancer, and cervical cancer. Checklist reviewing preventive services available has been given to the patient.    Reviewed patients plan of care and provided an AVS. The Basic Care Plan (routine screening as documented in Health Maintenance) for Margaret meets the Care Plan requirement. This Care Plan has been established and reviewed with the Patient.          Nila Montalvo MD MPH    [facility]    Identified Health Risks:    I have reviewed Opioid Use Disorder and Substance Use Disorder risk factors and made any needed referrals.       She is at risk for lack of exercise and has been provided with information to increase physical activity for the benefit of her well-being.  The patient was counseled and encouraged to consider modifying their diet and eating habits. She was provided with information on recommended healthy diet options.  The patient reports that she has difficulty with activities of daily living. I have asked that the patient make a follow up appointment in 1 year where this issue will be further evaluated and addressed.  The patient was provided with written information regarding signs of hearing loss.

## 2023-04-19 DIAGNOSIS — R74.8 ELEVATED LIVER ENZYMES: Primary | ICD-10-CM

## 2023-04-19 LAB
ALBUMIN SERPL-MCNC: 4 G/DL (ref 3.4–5)
ALP SERPL-CCNC: 111 U/L (ref 40–150)
ALT SERPL W P-5'-P-CCNC: 142 U/L (ref 0–50)
ANION GAP SERPL CALCULATED.3IONS-SCNC: 2 MMOL/L (ref 3–14)
AST SERPL W P-5'-P-CCNC: 135 U/L (ref 0–45)
BILIRUB SERPL-MCNC: 0.5 MG/DL (ref 0.2–1.3)
BUN SERPL-MCNC: 13 MG/DL (ref 7–30)
CALCIUM SERPL-MCNC: 9.4 MG/DL (ref 8.5–10.1)
CHLORIDE BLD-SCNC: 104 MMOL/L (ref 94–109)
CHOLEST SERPL-MCNC: 208 MG/DL
CO2 SERPL-SCNC: 30 MMOL/L (ref 20–32)
CREAT SERPL-MCNC: 0.58 MG/DL (ref 0.52–1.04)
FASTING STATUS PATIENT QL REPORTED: NO
GFR SERPL CREATININE-BSD FRML MDRD: >90 ML/MIN/1.73M2
GLUCOSE BLD-MCNC: 93 MG/DL (ref 70–99)
HDLC SERPL-MCNC: 74 MG/DL
LDLC SERPL CALC-MCNC: 86 MG/DL
NONHDLC SERPL-MCNC: 134 MG/DL
POTASSIUM BLD-SCNC: 3.8 MMOL/L (ref 3.4–5.3)
PROT SERPL-MCNC: 8.3 G/DL (ref 6.8–8.8)
SODIUM SERPL-SCNC: 136 MMOL/L (ref 133–144)
TRIGL SERPL-MCNC: 242 MG/DL

## 2023-04-19 NOTE — RESULT ENCOUNTER NOTE
Margaret,     Electrolytes and kidney function are normal.  LDL cholesterol is improved from last check.  However, liver function tests have increased.  Please STOP the cholesterol medication Atorvastatin. We will need to rechecks labs in 3-4 weeks, you can schedule a lab only visit for this. Avoid alcohol and tylenol over the counter.     Please do not hesitate to call us at [phone] if you have any questions or concerns.    Thank you,    Nila Montalvo MD MPH

## 2023-05-24 ENCOUNTER — LAB (OUTPATIENT)
Dept: LAB | Facility: CLINIC | Age: 68
End: 2023-05-24
Payer: COMMERCIAL

## 2023-05-24 DIAGNOSIS — R74.8 ELEVATED LIVER ENZYMES: ICD-10-CM

## 2023-05-24 LAB
ALBUMIN SERPL-MCNC: 3.8 G/DL (ref 3.4–5)
ALP SERPL-CCNC: 76 U/L (ref 40–150)
ALT SERPL W P-5'-P-CCNC: 23 U/L (ref 0–50)
AST SERPL W P-5'-P-CCNC: 21 U/L (ref 0–45)
BILIRUB DIRECT SERPL-MCNC: 0.1 MG/DL (ref 0–0.2)
BILIRUB SERPL-MCNC: 0.7 MG/DL (ref 0.2–1.3)
PROT SERPL-MCNC: 7.8 G/DL (ref 6.8–8.8)

## 2023-05-24 PROCEDURE — 80076 HEPATIC FUNCTION PANEL: CPT

## 2023-05-24 PROCEDURE — 36415 COLL VENOUS BLD VENIPUNCTURE: CPT

## 2023-05-24 NOTE — RESULT ENCOUNTER NOTE
Margaret,     Liver function tests are now normal.     Please do not hesitate to call us at [phone] if you have any questions or concerns.    Thank you,    Nila Montalvo MD MPH

## 2023-05-27 ENCOUNTER — ANCILLARY PROCEDURE (OUTPATIENT)
Dept: MAMMOGRAPHY | Facility: CLINIC | Age: 68
End: 2023-05-27
Payer: COMMERCIAL

## 2023-05-27 DIAGNOSIS — Z12.31 VISIT FOR SCREENING MAMMOGRAM: ICD-10-CM

## 2023-05-27 PROCEDURE — 77063 BREAST TOMOSYNTHESIS BI: CPT | Mod: TC | Performed by: RADIOLOGY

## 2023-05-27 PROCEDURE — 77067 SCR MAMMO BI INCL CAD: CPT | Mod: TC | Performed by: RADIOLOGY

## 2023-06-23 ENCOUNTER — TELEPHONE (OUTPATIENT)
Dept: FAMILY MEDICINE | Facility: CLINIC | Age: 68
End: 2023-06-23
Payer: COMMERCIAL

## 2023-06-23 NOTE — TELEPHONE ENCOUNTER
Patient's daughter, Sal[place] calling to find out if patient should continue not taking her cholesterol medication. RN notes there is not consent to communicate on file.     She is asking if a message can be sent to her mother's mychart advising if she should continue not taking atorvastatin or if she should resume taking atorvastatin.    Per lab note for lipid panel on 4/19/23 patient to stop atorvastatin. Patient did not have these labs rechecked.     Routing to provider to review and advise.    AMAYA RudolphN, RN  [facility]

## 2023-06-23 NOTE — TELEPHONE ENCOUNTER
Keep the cholesterol medication on hold since the liver tests improved with stopping the medication.  Thanks,  Nila Montalvo MD MPH

## 2023-06-23 NOTE — TELEPHONE ENCOUNTER
Will send patient MyC message with provider's message below, see MyC message 6/23/23. Will close this encounter.      BARBARA Hedrick, RN  [facility]

## 2024-01-10 ENCOUNTER — MYC MEDICAL ADVICE (OUTPATIENT)
Dept: FAMILY MEDICINE | Facility: CLINIC | Age: 69
End: 2024-01-10
Payer: COMMERCIAL

## 2024-04-16 SDOH — HEALTH STABILITY: PHYSICAL HEALTH: ON AVERAGE, HOW MANY DAYS PER WEEK DO YOU ENGAGE IN MODERATE TO STRENUOUS EXERCISE (LIKE A BRISK WALK)?: 7 DAYS

## 2024-04-16 SDOH — HEALTH STABILITY: PHYSICAL HEALTH: ON AVERAGE, HOW MANY MINUTES DO YOU ENGAGE IN EXERCISE AT THIS LEVEL?: 30 MIN

## 2024-04-16 ASSESSMENT — SOCIAL DETERMINANTS OF HEALTH (SDOH): HOW OFTEN DO YOU GET TOGETHER WITH FRIENDS OR RELATIVES?: ONCE A WEEK

## 2024-04-19 ENCOUNTER — OFFICE VISIT (OUTPATIENT)
Dept: FAMILY MEDICINE | Facility: CLINIC | Age: 69
End: 2024-04-19
Attending: PREVENTIVE MEDICINE
Payer: COMMERCIAL

## 2024-04-19 VITALS
BODY MASS INDEX: 21.55 KG/M2 | DIASTOLIC BLOOD PRESSURE: 70 MMHG | WEIGHT: 109.8 LBS | HEART RATE: 76 BPM | HEIGHT: 60 IN | SYSTOLIC BLOOD PRESSURE: 125 MMHG

## 2024-04-19 DIAGNOSIS — Z00.00 ENCOUNTER FOR MEDICARE ANNUAL WELLNESS EXAM: Primary | ICD-10-CM

## 2024-04-19 DIAGNOSIS — B35.1 ONYCHOMYCOSIS: ICD-10-CM

## 2024-04-19 DIAGNOSIS — L30.9 ECZEMA, UNSPECIFIED TYPE: ICD-10-CM

## 2024-04-19 DIAGNOSIS — M81.0 AGE-RELATED OSTEOPOROSIS WITHOUT CURRENT PATHOLOGICAL FRACTURE: ICD-10-CM

## 2024-04-19 DIAGNOSIS — R74.8 ELEVATED LIVER ENZYMES: ICD-10-CM

## 2024-04-19 DIAGNOSIS — E78.2 MIXED HYPERLIPIDEMIA: ICD-10-CM

## 2024-04-19 DIAGNOSIS — Z00.00 ROUTINE GENERAL MEDICAL EXAMINATION AT A HEALTH CARE FACILITY: ICD-10-CM

## 2024-04-19 DIAGNOSIS — H04.123 DRY EYES: ICD-10-CM

## 2024-04-19 LAB — HGB BLD-MCNC: 13.3 G/DL (ref 11.7–15.7)

## 2024-04-19 PROCEDURE — 99397 PER PM REEVAL EST PAT 65+ YR: CPT | Performed by: PREVENTIVE MEDICINE

## 2024-04-19 PROCEDURE — 80061 LIPID PANEL: CPT | Performed by: PREVENTIVE MEDICINE

## 2024-04-19 PROCEDURE — 99214 OFFICE O/P EST MOD 30 MIN: CPT | Mod: 25 | Performed by: PREVENTIVE MEDICINE

## 2024-04-19 PROCEDURE — 85018 HEMOGLOBIN: CPT | Performed by: PREVENTIVE MEDICINE

## 2024-04-19 PROCEDURE — 36415 COLL VENOUS BLD VENIPUNCTURE: CPT | Performed by: PREVENTIVE MEDICINE

## 2024-04-19 PROCEDURE — 80053 COMPREHEN METABOLIC PANEL: CPT | Performed by: PREVENTIVE MEDICINE

## 2024-04-19 RX ORDER — CARBOXYMETHYLCELLULOSE SODIUM 5 MG/ML
1 SOLUTION/ DROPS OPHTHALMIC 3 TIMES DAILY PRN
Qty: 30 EACH | Refills: 3 | Status: SHIPPED | OUTPATIENT
Start: 2024-04-19

## 2024-04-19 RX ORDER — ALENDRONATE SODIUM 70 MG/1
70 TABLET ORAL
Qty: 12 TABLET | Refills: 4 | Status: SHIPPED | OUTPATIENT
Start: 2024-04-19

## 2024-04-19 RX ORDER — TRIAMCINOLONE ACETONIDE 1 MG/G
CREAM TOPICAL 2 TIMES DAILY
Qty: 80 G | Refills: 1 | Status: SHIPPED | OUTPATIENT
Start: 2024-04-19

## 2024-04-19 SDOH — HEALTH STABILITY: PHYSICAL HEALTH: ON AVERAGE, HOW MANY DAYS PER WEEK DO YOU ENGAGE IN MODERATE TO STRENUOUS EXERCISE (LIKE A BRISK WALK)?: 5 DAYS

## 2024-04-19 SDOH — HEALTH STABILITY: PHYSICAL HEALTH: ON AVERAGE, HOW MANY MINUTES DO YOU ENGAGE IN EXERCISE AT THIS LEVEL?: 30 MIN

## 2024-04-19 ASSESSMENT — SOCIAL DETERMINANTS OF HEALTH (SDOH): HOW OFTEN DO YOU GET TOGETHER WITH FRIENDS OR RELATIVES?: MORE THAN THREE TIMES A WEEK

## 2024-04-19 NOTE — PROGRESS NOTES
Preventive Care Visit  [facility] CODEY Montalvo MD, Family Medicine  Apr 19, 2024      Assessment & Plan     Encounter for Medicare annual wellness exam  - PRIMARY CARE FOLLOW-UP SCHEDULING  -Declined immunizations today, will schedule at a later time    Mixed hyperlipidemia  - Lipid panel reflex to direct LDL Non-fasting  - Comprehensive metabolic panel (BMP + Alb, Alk Phos, ALT, AST, Total. Bili, TP)  -Patient had been started on statins, however ended up with elevated liver function tests.  These normalized once statin was discontinued.  At this time defer further use of statins    The 10-year ASCVD risk score (Fabiana CIFUENTES, et al., 2019) is: 6.9%    Values used to calculate the score:      Age: 68 years      Sex: Female      Is Non- : No      Diabetic: No      Tobacco smoker: No      Systolic Blood Pressure: 125 mmHg      Is BP treated: No      HDL Cholesterol: 74 mg/dL      Total Cholesterol: 208 mg/dL      Routine general medical examination at a health care facility  -Preventive guidelines reviewed    Elevated liver enzymes  -Liver function tests had normalized after stopping statin  - Comprehensive metabolic panel (BMP + Alb, Alk Phos, ALT, AST, Total. Bili, TP)  - Comprehensive metabolic panel (BMP + Alb, Alk Phos, ALT, AST, Total. Bili, TP)    Age-related osteoporosis without current pathological fracture  -Ran out of medication, refills provided  - Hemoglobin  - Comprehensive metabolic panel (BMP + Alb, Alk Phos, ALT, AST, Total. Bili, TP)  - alendronate (FOSAMAX) 70 MG tablet  Dispense: 12 tablet; Refill: 4  - Hemoglobin  - Comprehensive metabolic panel (BMP + Alb, Alk Phos, ALT, AST, Total. Bili, TP)  -DEXA scan done 5/22: Showed osteoporosis  -Continue calcium and vitamin D and weightbearing exercise    Eczema, unspecified type  -Refills on medication provided  - triamcinolone (KENALOG) 0.1 % external cream  Dispense: 80 g; Refill:  1    Onychomycosis  -Toenails with onychomycosis  -Discussed treatment options with patient  -Reviewed that the best treatment would be an oral antifungal for 3 months.  However, due to her prior history of abnormal liver function tests would defer using oral antifungals at this time.    Dry eyes  - carboxymethylcellulose PF (REFRESH PLUS) 0.5 % ophthalmic solution  Dispense: 30 each; Refill: 3      Ordering of each unique test  Prescription drug management  22 minutes spent by me on the date of the encounter doing chart review, history and exam, documentation and further activities per the note      Counseling  Appropriate preventive services were discussed with this patient, including applicable screening as appropriate for fall prevention, nutrition, physical activity, Tobacco-use cessation, weight loss and cognition.  Checklist reviewing preventive services available has been given to the patient.  Reviewed patient's diet, addressing concerns and/or questions.         Nimesh Robles is a 68 year old, presenting for the following:  Annual Visit        4/19/2024     7:53 AM   Additional Questions   Roomed by asad        Health Care Directive  Patient does not have a Health Care Directive or Living Will: Discussed advance care planning with patient; however, patient declined at this time.    HPI      Doing Good+ no major health changes     Declined Covid vaccine and other vaccines today    Had concerns for discoloration of the toenails.  More heavy lifting at work so feel like her muscles are very sore when she gets back.    Visit done with [employer] Telephone interpretor     Hyperlipidemia Follow-Up     Are you regularly taking any medication or supplement to lower your cholesterol?   Yes- statin  Are you having muscle aches or other side effects that you think could be caused by your cholesterol lowering medication?  No          4/16/2024   General Health   How would you rate your overall physical health?  (!) FAIR   Feel stress (tense, anxious, or unable to sleep) To some extent   (!) STRESS CONCERN      4/16/2024   Nutrition   Diet: Regular (no restrictions)         4/16/2024   Exercise   Days per week of moderate/strenous exercise 7 days   Average minutes spent exercising at this level 30 min         4/16/2024   Social Factors   Frequency of gathering with friends or relatives Once a week   Worry food won't last until get money to buy more Patient declined   Food not last or not have enough money for food? Patient declined   Do you have housing?  Patient declined   Are you worried about losing your housing? Patient declined   Lack of transportation? Patient declined   Unable to get utilities (heat,electricity)? Patient declined         4/19/2024   Fall Risk   Fallen 2 or more times in the past year? No   Trouble with walking or balance? No          4/16/2024   Activities of Daily Living- Home Safety   Needs help with the following daily activites None of the above   Safety concerns in the home None of the above         4/16/2024   Dental   Dentist two times every year? Yes         4/16/2024   Hearing Screening   Hearing concerns? None of the above         4/16/2024   Driving Risk Screening   Patient/family members have concerns about driving (!) DECLINE         4/16/2024   General Alertness/Fatigue Screening   Have you been more tired than usual lately? No         4/16/2024   Urinary Incontinence Screening   Bothered by leaking urine in past 6 months No         4/16/2024   TB Screening   Were you born outside of the [place]? Yes           Today's PHQ-2 Score:       4/19/2024     8:04 AM   PHQ-2 ( 1999 Pfizer)   Q1: Little interest or pleasure in doing things 0   Q2: Feeling down, depressed or hopeless 0   PHQ-2 Score 0   Q1: Little interest or pleasure in doing things Not at all   Q2: Feeling down, depressed or hopeless Not at all   PHQ-2 Score 0         4/16/2024   Substance Use   Alcohol more than 3/day or more than  7/wk No   Do you have a current opioid prescription? No   How severe/bad is pain from 1 to 10? 5/10   Do you use any other substances recreationally? No    (!) DECLINE     Social History     Tobacco Use    Smoking status: Never    Smokeless tobacco: Never   Substance Use Topics    Alcohol use: No    Drug use: No           5/27/2023   LAST FHS-7 RESULTS   1st degree relative breast or ovarian cancer No   Any relative bilateral breast cancer No   Any male have breast cancer No   Any ONE woman have BOTH breast AND ovarian cancer No   Any woman with breast cancer before 50yrs No   2 or more relatives with breast AND/OR ovarian cancer No   2 or more relatives with breast AND/OR bowel cancer No        Mammogram Screening - Mammogram every 1-2 years updated in Health Maintenance based on mutual decision making    ASCVD Risk   The 10-year ASCVD risk score (Fabiana CIFUENTES, et al., 2019) is: 6.9%    Values used to calculate the score:      Age: 68 years      Sex: Female      Is Non- : No      Diabetic: No      Tobacco smoker: No      Systolic Blood Pressure: 125 mmHg      Is BP treated: No      HDL Cholesterol: 74 mg/dL      Total Cholesterol: 208 mg/dL          Reviewed and updated as needed this visit by Provider   Tobacco  Allergies  Meds  Problems  Med Hx  Surg Hx  Fam Hx            History reviewed. No pertinent past medical history.  Past Surgical History:   Procedure Laterality Date    BLEPHAROPLASTY Right 12/30/2019    Procedure: Right upper eyelid blepharoplasty;  Surgeon: Sanket Bello MD;  Location:  OR     Lab work is in process  Labs reviewed in EPIC  BP Readings from Last 3 Encounters:   04/19/24 125/70   04/18/23 124/75   04/14/22 135/74    Wt Readings from Last 3 Encounters:   04/19/24 49.8 kg (109 lb 12.8 oz)   04/18/23 50.8 kg (112 lb)   04/14/22 48.1 kg (106 lb)                  Patient Active Problem List   Diagnosis    Combined forms of age-related cataract of  both eyes    Meibomian gland disease, unspecified laterality    Mixed hyperlipidemia    Impaired fasting glucose    Dermatochalasis of both upper eyelids    Brow ptosis    Benign paroxysmal positional vertigo of right ear    Age-related osteoporosis without current pathological fracture     Past Surgical History:   Procedure Laterality Date    BLEPHAROPLASTY Right 12/30/2019    Procedure: Right upper eyelid blepharoplasty;  Surgeon: Sanket Bello MD;  Location:  OR       Social History     Tobacco Use    Smoking status: Never    Smokeless tobacco: Never   Substance Use Topics    Alcohol use: No     Family History   Problem Relation Age of Onset    Cataracts Mother     Hypertension Mother     Cancer Father     Glaucoma No family hx of     Macular Degeneration No family hx of          Current Outpatient Medications   Medication Sig Dispense Refill    alendronate (FOSAMAX) 70 MG tablet Take 1 tablet (70 mg) by mouth every 7 days 12 tablet 4    carboxymethylcellulose PF (REFRESH PLUS) 0.5 % ophthalmic solution Place 1 drop into both eyes 3 times daily as needed for dry eyes 30 each 3    triamcinolone (KENALOG) 0.1 % external cream Apply topically 2 times daily 80 g 1    VITAMIN D, CHOLECALCIFEROL, PO Take 5,000 Units by mouth daily       Allergies   Allergen Reactions    Atorvastatin      Increased LFTs improved with stopping of statin     B1-Ca     No Clinical Screening - See Comments Other (See Comments)     Vitamin B1 - feels like she is going to pass out when taking this medication     Current providers sharing in care for this patient include:  Patient Care Team:  No Ref-Primary, Physician as PCP - Nila Mcdonough MD as Assigned PCP    The following health maintenance items are reviewed in Epic and correct as of today:  Health Maintenance   Topic Date Due    ZOSTER IMMUNIZATION (1 of 2) Never done    RSV VACCINE (Pregnancy & 60+) (1 - 1-dose 60+ series) Never done    INFLUENZA VACCINE (1) 09/01/2023  "   COVID-19 Vaccine (1 - 2023-24 season) Never done    LIPID  04/18/2024    ANNUAL REVIEW OF HM ORDERS  04/18/2024    DTAP/TDAP/TD IMMUNIZATION (3 - Td or Tdap) 01/15/2025    MEDICARE ANNUAL WELLNESS VISIT  04/19/2025    FALL RISK ASSESSMENT  04/19/2025    MAMMO SCREENING  05/27/2025    GLUCOSE  04/18/2026    COLORECTAL CANCER SCREENING  12/27/2026    ADVANCE CARE PLANNING  04/18/2028    DEXA  05/16/2037    HEPATITIS C SCREENING  Completed    PHQ-2 (once per calendar year)  Completed    Pneumococcal Vaccine: 65+ Years  Completed    IPV IMMUNIZATION  Aged Out    HPV IMMUNIZATION  Aged Out    MENINGITIS IMMUNIZATION  Aged Out    RSV MONOCLONAL ANTIBODY  Aged Out    PAP  Discontinued         Review of Systems  Constitutional, HEENT, cardiovascular, pulmonary, gi and gu systems are negative, except as otherwise noted.     Objective    Exam  /70   Pulse 76   Ht 1.511 m (4' 11.5\")   Wt 49.8 kg (109 lb 12.8 oz)   LMP  (LMP Unknown)   BMI 21.81 kg/m     Estimated body mass index is 21.81 kg/m  as calculated from the following:    Height as of this encounter: 1.511 m (4' 11.5\").    Weight as of this encounter: 49.8 kg (109 lb 12.8 oz).    Physical Exam  GENERAL APPEARANCE: healthy, alert and no distress  EYES: Eyes grossly normal to inspection and conjunctivae and sclerae normal  NECK: no adenopathy and trachea midline and normal to palpation  RESP: lungs clear to auscultation - no rales, rhonchi or wheezes  CV: regular rates and rhythm, normal S1 S2  ABDOMEN: soft, non-tender and no rebound or guarding   MS: extremities normal- no gross deformities noted and peripheral pulses normal  SKIN: no suspicious lesions or rashes  NEURO: Normal strength and tone, mentation intact and speech normal  PSYCH: mentation appears normal  Toenails: discolored and thickened nails            4/19/2024   Mini Cog   Clock Draw Score 2 Normal   3 Item Recall 3 objects recalled   Mini Cog Total Score 5             Signed Electronically " by: Nila Montalvo MD MPH

## 2024-04-19 NOTE — RESULT ENCOUNTER NOTE
Margaret,     Hemoglobin is normal, other labs are pending.    Please do not hesitate to call us at [phone] if you have any questions or concerns.    Thank you,    Nila Montalvo MD MPH

## 2024-04-19 NOTE — PATIENT INSTRUCTIONS
Icy Hot patch  Capsaicin cream  Diclofenac gel       Preventive Care Advice   This is general advice given by our system to help you stay healthy. However, your care team may have specific advice just for you. Please talk to your care team about your preventive care needs.  Nutrition  Eat 5 or more servings of fruits and vegetables each day.  Try wheat bread, brown rice and whole grain pasta (instead of white bread, rice, and pasta).  Get enough calcium and vitamin D. Check the label on foods and aim for 100% of the RDA (recommended daily allowance).  Lifestyle  Exercise at least 150 minutes each week   (30 minutes a day, 5 days a week).  Do muscle strengthening activities 2 days a week. These help control your weight and prevent disease.  No smoking.  Wear sunscreen to prevent skin cancer.  Have a dental exam and cleaning every 6 months.  Yearly exams  See your health care team every year to talk about:  Any changes in your health.  Any medicines your care team has prescribed.  Preventive care, family planning, and ways to prevent chronic diseases.  Shots (vaccines)   HPV shots (up to age 26), if you've never had them before.  Hepatitis B shots (up to age 59), if you've never had them before.  COVID-19 shot: Get this shot when it's due.  Flu shot: Get a flu shot every year.  Tetanus shot: Get a tetanus shot every 10 years.  Pneumococcal, hepatitis A, and RSV shots: Ask your care team if you need these based on your risk.  Shingles shot (for age 50 and up).  General health tests  Diabetes screening:  Starting at age 35, Get screened for diabetes at least every 3 years.  If you are younger than age 35, ask your care team if you should be screened for diabetes.  Cholesterol test: At age 39, start having a cholesterol test every 5 years, or more often if advised.  Bone density scan (DEXA): At age 50, ask your care team if you should have this scan for osteoporosis (brittle bones).  Hepatitis C: Get tested at least once  in your life.  STIs (sexually transmitted infections)  Before age 24: Ask your care team if you should be screened for STIs.  After age 24: Get screened for STIs if you're at risk. You are at risk for STIs (including HIV) if:  You are sexually active with more than one person.  You don't use condoms every time.  You or a partner was diagnosed with a sexually transmitted infection.  If you are at risk for HIV, ask about PrEP medicine to prevent HIV.  Get tested for HIV at least once in your life, whether you are at risk for HIV or not.  Cancer screening tests  Cervical cancer screening: If you have a cervix, begin getting regular cervical cancer screening tests at age 21. Most people who have regular screenings with normal results can stop after age 65. Talk about this with your provider.  Breast cancer scan (mammogram): If you've ever had breasts, begin having regular mammograms starting at age 40. This is a scan to check for breast cancer.  Colon cancer screening: It is important to start screening for colon cancer at age 45.  Have a colonoscopy test every 10 years (or more often if you're at risk) Or, ask your provider about stool tests like a FIT test every year or Cologuard test every 3 years.  To learn more about your testing options, visit: https://www.[employer]/512445.pdf.  For help making a decision, visit: https://bit.ly/zo38873.  Prostate cancer screening test: If you have a prostate and are age 55 to 69, ask your provider if you would benefit from a yearly prostate cancer screening test.  Lung cancer screening: If you are a current or former smoker age 50 to 80, ask your care team if ongoing lung cancer screenings are right for you.  For informational purposes only. Not to replace the advice of your health care provider. Copyright   2023 [facility]. All rights reserved. Clinically reviewed by the [facility]itions Program. [employer] 147015 - REV 01/24.    Learning About  Stress  What is stress?     Stress is your body's response to a hard situation. Your body can have a physical, emotional, or mental response. Stress is a fact of life for most people, and it affects everyone differently. What causes stress for you may not be stressful for someone else.  A lot of things can cause stress. You may feel stress when you go on a job interview, take a test, or run a race. This kind of short-term stress is normal and even useful. It can help you if you need to work hard or react quickly. For example, stress can help you finish an important job on time.  Long-term stress is caused by ongoing stressful situations or events. Examples of long-term stress include long-term health problems, ongoing problems at work, or conflicts in your family. Long-term stress can harm your health.  How does stress affect your health?  When you are stressed, your body responds as though you are in danger. It makes hormones that speed up your heart, make you breathe faster, and give you a burst of energy. This is called the fight-or-flight stress response. If the stress is over quickly, your body goes back to normal and no harm is done.  But if stress happens too often or lasts too long, it can have bad effects. Long-term stress can make you more likely to get sick, and it can make symptoms of some diseases worse. If you tense up when you are stressed, you may develop neck, shoulder, or low back pain. Stress is linked to high blood pressure and heart disease.  Stress also harms your emotional health. It can make you souza, tense, or depressed. Your relationships may suffer, and you may not do well at work or school.  What can you do to manage stress?  You can try these things to help manage stress:   Do something active. Exercise or activity can help reduce stress. Walking is a great way to get started. Even everyday activities such as housecleaning or yard work can help.  Try yoga or ashley chi. These techniques  combine exercise and meditation. You may need some training at first to learn them.  Do something you enjoy. For example, listen to music or go to a movie. Practice your hobby or do volunteer work.  Meditate. This can help you relax, because you are not worrying about what happened before or what may happen in the future.  Do guided imagery. Imagine yourself in any setting that helps you feel calm. You can use online videos, books, or a teacher to guide you.  Do breathing exercises. For example:  From a standing position, bend forward from the waist with your knees slightly bent. Let your arms dangle close to the floor.  Breathe in slowly and deeply as you return to a standing position. Roll up slowly and lift your head last.  Hold your breath for just a few seconds in the standing position.  Breathe out slowly and bend forward from the waist.  Let your feelings out. Talk, laugh, cry, and express anger when you need to. Talking with supportive friends or family, a counselor, or a christian leader about your feelings is a healthy way to relieve stress. Avoid discussing your feelings with people who make you feel worse.  Write. It may help to write about things that are bothering you. This helps you find out how much stress you feel and what is causing it. When you know this, you can find better ways to cope.  What can you do to prevent stress?  You might try some of these things to help prevent stress:  Manage your time. This helps you find time to do the things you want and need to do.  Get enough sleep. Your body recovers from the stresses of the day while you are sleeping.  Get support. Your family, friends, and community can make a difference in how you experience stress.  Limit your news feed. Avoid or limit time on social media or news that may make you feel stressed.  Do something active. Exercise or activity can help reduce stress. Walking is a great way to get started.  Where can you learn more?  Go to  "https://www.[employer].net/patiented  Enter N032 in the search box to learn more about \"Learning About Stress.\"  Current as of: October 24, 2023               Content Version: 14.0    3043-1479 [employer].   Care instructions adapted under license by your healthcare professional. If you have questions about a medical condition or this instruction, always ask your healthcare professional. [employer], [employer] disclaims any warranty or liability for your use of this information.      "

## 2024-04-20 LAB
ALBUMIN SERPL BCG-MCNC: 4.6 G/DL (ref 3.5–5.2)
ALP SERPL-CCNC: 126 U/L (ref 40–150)
ALT SERPL W P-5'-P-CCNC: 25 U/L (ref 0–50)
ANION GAP SERPL CALCULATED.3IONS-SCNC: 11 MMOL/L (ref 7–15)
AST SERPL W P-5'-P-CCNC: 28 U/L (ref 0–45)
BILIRUB SERPL-MCNC: 1 MG/DL
BUN SERPL-MCNC: 12.4 MG/DL (ref 8–23)
CALCIUM SERPL-MCNC: 9.6 MG/DL (ref 8.8–10.2)
CHLORIDE SERPL-SCNC: 101 MMOL/L (ref 98–107)
CHOLEST SERPL-MCNC: 267 MG/DL
CREAT SERPL-MCNC: 0.66 MG/DL (ref 0.51–0.95)
DEPRECATED HCO3 PLAS-SCNC: 27 MMOL/L (ref 22–29)
EGFRCR SERPLBLD CKD-EPI 2021: >90 ML/MIN/1.73M2
FASTING STATUS PATIENT QL REPORTED: YES
GLUCOSE SERPL-MCNC: 99 MG/DL (ref 70–99)
HDLC SERPL-MCNC: 62 MG/DL
LDLC SERPL CALC-MCNC: 145 MG/DL
NONHDLC SERPL-MCNC: 205 MG/DL
POTASSIUM SERPL-SCNC: 3.8 MMOL/L (ref 3.4–5.3)
PROT SERPL-MCNC: 7.8 G/DL (ref 6.4–8.3)
SODIUM SERPL-SCNC: 139 MMOL/L (ref 135–145)
TRIGL SERPL-MCNC: 301 MG/DL

## 2024-04-22 NOTE — RESULT ENCOUNTER NOTE
Dear Margaret Healy    Here are your cholesterol results:    Your LDL is: Lab Results       Component                Value               Date                       LDL                      145                 04/19/2024                 LDL                      139                 10/26/2020          Your LDL goal is to be less than 160  Your HDL is: Lab Results       Component                Value               Date                       HDL                      62                  04/19/2024                 HDL                      74                  10/26/2020           Goal HDL is Greater than 40 (for men) or 50 (for women).  Your Triglycerides are: Lab Results       Component                Value               Date                       TRIG                     301                 04/19/2024                 TRIG                     102                 10/26/2020          Goal TRIGLYCERIDES are less than 150.       Here are some ways to improve your cholesterol without medication:    Try to get at least 45 minutes of aerobic exercise 5-6 days a week  Maintain a healthy body weight  Eat less saturated fats  Buy lean cuts of meat, reduce your portions of red meat or substitute poultry or fish  Avoid fried or fast foods that are high in fat  Eat more fruits and vegetables    Electrolytes, glucose, kidney function and liver function tests are normal.     Please do not hesitate to call us at [phone] if you have any questions or concerns.    Thank you,    Nila Montalvo MD MPH

## 2024-04-27 ENCOUNTER — MYC MEDICAL ADVICE (OUTPATIENT)
Dept: FAMILY MEDICINE | Facility: CLINIC | Age: 69
End: 2024-04-27
Payer: COMMERCIAL

## 2024-04-27 DIAGNOSIS — M81.0 AGE-RELATED OSTEOPOROSIS WITHOUT CURRENT PATHOLOGICAL FRACTURE: Primary | ICD-10-CM

## 2024-04-29 NOTE — TELEPHONE ENCOUNTER
Pt requesting dexa scan order. Pt last seen 4/19/24. Per Care Gaps next dexa scan due 5/16/2037.     Routing to provider to advise.     Kathleen Sheriff RN

## 2024-04-30 NOTE — TELEPHONE ENCOUNTER
Last DEXA done in 5/2022. Is on medication for Osteoporosis. OK to repeat DEXA scan in 2 years hence orders placed. Patient can call [phone] to make the appointment.   Thank you.  Nila Montalvo MD MPH

## 2024-05-28 ENCOUNTER — ANCILLARY PROCEDURE (OUTPATIENT)
Dept: BONE DENSITY | Facility: CLINIC | Age: 69
End: 2024-05-28
Attending: PREVENTIVE MEDICINE
Payer: COMMERCIAL

## 2024-05-28 ENCOUNTER — ANCILLARY PROCEDURE (OUTPATIENT)
Dept: MAMMOGRAPHY | Facility: CLINIC | Age: 69
End: 2024-05-28
Payer: COMMERCIAL

## 2024-05-28 DIAGNOSIS — M81.0 AGE-RELATED OSTEOPOROSIS WITHOUT CURRENT PATHOLOGICAL FRACTURE: ICD-10-CM

## 2024-05-28 DIAGNOSIS — Z12.31 VISIT FOR SCREENING MAMMOGRAM: ICD-10-CM

## 2024-05-28 PROCEDURE — 77080 DXA BONE DENSITY AXIAL: CPT | Performed by: RADIOLOGY

## 2024-05-28 PROCEDURE — 77063 BREAST TOMOSYNTHESIS BI: CPT | Performed by: RADIOLOGY

## 2024-05-28 PROCEDURE — 77067 SCR MAMMO BI INCL CAD: CPT | Performed by: RADIOLOGY

## 2024-05-28 NOTE — RESULT ENCOUNTER NOTE
Margaret,     Bone density scan is showing Osteoporosis, no significant change compared to previous Bone density scan.     Please do not hesitate to call us at [phone] if you have any questions or concerns.    Thank you,    Nila Montalvo MD MPH

## 2025-04-20 SDOH — HEALTH STABILITY: PHYSICAL HEALTH: ON AVERAGE, HOW MANY MINUTES DO YOU ENGAGE IN EXERCISE AT THIS LEVEL?: 30 MIN

## 2025-04-20 SDOH — HEALTH STABILITY: PHYSICAL HEALTH: ON AVERAGE, HOW MANY DAYS PER WEEK DO YOU ENGAGE IN MODERATE TO STRENUOUS EXERCISE (LIKE A BRISK WALK)?: 5 DAYS

## 2025-04-20 ASSESSMENT — SOCIAL DETERMINANTS OF HEALTH (SDOH): HOW OFTEN DO YOU GET TOGETHER WITH FRIENDS OR RELATIVES?: MORE THAN THREE TIMES A WEEK

## 2025-04-21 ENCOUNTER — APPOINTMENT (OUTPATIENT)
Dept: INTERPRETER SERVICES | Facility: CLINIC | Age: 70
End: 2025-04-21
Payer: COMMERCIAL

## 2025-05-05 SDOH — HEALTH STABILITY: PHYSICAL HEALTH: ON AVERAGE, HOW MANY DAYS PER WEEK DO YOU ENGAGE IN MODERATE TO STRENUOUS EXERCISE (LIKE A BRISK WALK)?: 5 DAYS

## 2025-05-05 SDOH — HEALTH STABILITY: PHYSICAL HEALTH: ON AVERAGE, HOW MANY MINUTES DO YOU ENGAGE IN EXERCISE AT THIS LEVEL?: 30 MIN

## 2025-05-05 ASSESSMENT — SOCIAL DETERMINANTS OF HEALTH (SDOH): HOW OFTEN DO YOU GET TOGETHER WITH FRIENDS OR RELATIVES?: ONCE A WEEK

## 2025-05-06 ENCOUNTER — OFFICE VISIT (OUTPATIENT)
Dept: FAMILY MEDICINE | Facility: CLINIC | Age: 70
End: 2025-05-06
Attending: PREVENTIVE MEDICINE
Payer: COMMERCIAL

## 2025-05-06 ENCOUNTER — ANCILLARY PROCEDURE (OUTPATIENT)
Dept: GENERAL RADIOLOGY | Facility: CLINIC | Age: 70
End: 2025-05-06
Attending: PREVENTIVE MEDICINE
Payer: COMMERCIAL

## 2025-05-06 VITALS
HEART RATE: 81 BPM | WEIGHT: 111 LBS | TEMPERATURE: 97.6 F | SYSTOLIC BLOOD PRESSURE: 135 MMHG | BODY MASS INDEX: 21.79 KG/M2 | RESPIRATION RATE: 18 BRPM | DIASTOLIC BLOOD PRESSURE: 85 MMHG | HEIGHT: 60 IN | OXYGEN SATURATION: 99 %

## 2025-05-06 DIAGNOSIS — L30.9 ECZEMA, UNSPECIFIED TYPE: ICD-10-CM

## 2025-05-06 DIAGNOSIS — Z13.6 SCREENING FOR CARDIOVASCULAR CONDITION: ICD-10-CM

## 2025-05-06 DIAGNOSIS — M77.8 RIGHT SHOULDER TENDONITIS: ICD-10-CM

## 2025-05-06 DIAGNOSIS — M81.0 AGE-RELATED OSTEOPOROSIS WITHOUT CURRENT PATHOLOGICAL FRACTURE: ICD-10-CM

## 2025-05-06 DIAGNOSIS — Z23 ENCOUNTER FOR IMMUNIZATION: ICD-10-CM

## 2025-05-06 DIAGNOSIS — Z00.00 ROUTINE GENERAL MEDICAL EXAMINATION AT A HEALTH CARE FACILITY: Primary | ICD-10-CM

## 2025-05-06 DIAGNOSIS — Z12.31 VISIT FOR SCREENING MAMMOGRAM: ICD-10-CM

## 2025-05-06 DIAGNOSIS — E78.2 MIXED HYPERLIPIDEMIA: ICD-10-CM

## 2025-05-06 DIAGNOSIS — R74.8 ELEVATED LIVER ENZYMES: ICD-10-CM

## 2025-05-06 LAB
ALBUMIN SERPL BCG-MCNC: 4.6 G/DL (ref 3.5–5.2)
ALP SERPL-CCNC: 159 U/L (ref 40–150)
ALT SERPL W P-5'-P-CCNC: 66 U/L (ref 0–50)
ANION GAP SERPL CALCULATED.3IONS-SCNC: 10 MMOL/L (ref 7–15)
AST SERPL W P-5'-P-CCNC: 55 U/L (ref 0–45)
BILIRUB SERPL-MCNC: 0.7 MG/DL
BUN SERPL-MCNC: 15.7 MG/DL (ref 8–23)
CALCIUM SERPL-MCNC: 9.8 MG/DL (ref 8.8–10.4)
CHLORIDE SERPL-SCNC: 103 MMOL/L (ref 98–107)
CHOLEST SERPL-MCNC: 273 MG/DL
CREAT SERPL-MCNC: 0.62 MG/DL (ref 0.51–0.95)
EGFRCR SERPLBLD CKD-EPI 2021: >90 ML/MIN/1.73M2
FASTING STATUS PATIENT QL REPORTED: YES
FASTING STATUS PATIENT QL REPORTED: YES
GLUCOSE SERPL-MCNC: 101 MG/DL (ref 70–99)
HCO3 SERPL-SCNC: 26 MMOL/L (ref 22–29)
HDLC SERPL-MCNC: 77 MG/DL
LDLC SERPL CALC-MCNC: 169 MG/DL
NONHDLC SERPL-MCNC: 196 MG/DL
POTASSIUM SERPL-SCNC: 4 MMOL/L (ref 3.4–5.3)
PROT SERPL-MCNC: 7.9 G/DL (ref 6.4–8.3)
SODIUM SERPL-SCNC: 139 MMOL/L (ref 135–145)
TRIGL SERPL-MCNC: 137 MG/DL

## 2025-05-06 PROCEDURE — 3075F SYST BP GE 130 - 139MM HG: CPT | Performed by: PREVENTIVE MEDICINE

## 2025-05-06 PROCEDURE — 90471 IMMUNIZATION ADMIN: CPT | Performed by: PREVENTIVE MEDICINE

## 2025-05-06 PROCEDURE — 1126F AMNT PAIN NOTED NONE PRSNT: CPT | Performed by: PREVENTIVE MEDICINE

## 2025-05-06 PROCEDURE — 99214 OFFICE O/P EST MOD 30 MIN: CPT | Mod: 25 | Performed by: PREVENTIVE MEDICINE

## 2025-05-06 PROCEDURE — 80061 LIPID PANEL: CPT | Performed by: PREVENTIVE MEDICINE

## 2025-05-06 PROCEDURE — 3078F DIAST BP <80 MM HG: CPT | Performed by: PREVENTIVE MEDICINE

## 2025-05-06 PROCEDURE — 73030 X-RAY EXAM OF SHOULDER: CPT | Mod: TC | Performed by: RADIOLOGY

## 2025-05-06 PROCEDURE — 90715 TDAP VACCINE 7 YRS/> IM: CPT | Performed by: PREVENTIVE MEDICINE

## 2025-05-06 PROCEDURE — 80053 COMPREHEN METABOLIC PANEL: CPT | Performed by: PREVENTIVE MEDICINE

## 2025-05-06 PROCEDURE — 99397 PER PM REEVAL EST PAT 65+ YR: CPT | Mod: 25 | Performed by: PREVENTIVE MEDICINE

## 2025-05-06 PROCEDURE — 36415 COLL VENOUS BLD VENIPUNCTURE: CPT | Performed by: PREVENTIVE MEDICINE

## 2025-05-06 RX ORDER — TRIAMCINOLONE ACETONIDE 1 MG/G
OINTMENT TOPICAL 2 TIMES DAILY
Qty: 30 G | Refills: 1 | Status: SHIPPED | OUTPATIENT
Start: 2025-05-06

## 2025-05-06 RX ORDER — ALENDRONATE SODIUM 70 MG/1
70 TABLET ORAL
Qty: 12 TABLET | Refills: 4 | Status: SHIPPED | OUTPATIENT
Start: 2025-05-06

## 2025-05-06 ASSESSMENT — PAIN SCALES - GENERAL: PAINLEVEL_OUTOF10: NO PAIN (0)

## 2025-05-06 NOTE — RESULT ENCOUNTER NOTE
Dear Margaret Healy    Here are your cholesterol results:    Your LDL is: Lab Results       Component                Value               Date                       LDL                      169                 05/06/2025                 LDL                      139                 10/26/2020           Your LDL goal is to be less than 160  Your HDL is: Lab Results       Component                Value               Date                       HDL                      77                  05/06/2025                 HDL                      74                  10/26/2020           Goal HDL is Greater than 40 (for men) or 50 (for women).  Your Triglycerides are: Lab Results       Component                Value               Date                       TRIG                     137                 05/06/2025                 TRIG                     102                 10/26/2020           Goal TRIGLYCERIDES are less than 150.         Here are some ways to improve your cholesterol without medication:    Try to get at least 45 minutes of aerobic exercise 5-6 days a week  Maintain a healthy body weight  Eat less saturated fats  Buy lean cuts of meat, reduce your portions of red meat or substitute poultry or fish  Avoid fried or fast foods that are high in fat  Eat more fruits and vegetables      Electrolytes, glucose and kidney function are within normal limits.  Liver tests are elevated.   Hepatitis labs were checked already in 2022.  I would like to repeat an ultrasound.   Please call [phone] to schedule Ultrasound of the Liver.     Please do not hesitate to call us at [phone] if you have any questions or concerns.    Thank you,    Nila Montalvo MD MPH

## 2025-05-06 NOTE — PROGRESS NOTES
Preventive Care Visit  [facility] CODEYSATURNINO Montalvo MD, Family Medicine  May 6, 2025      Assessment & Plan     Routine general medical examination at a health care facility  -preventive guidelines reviewed   - REVIEW OF HEALTH MAINTENANCE PROTOCOL ORDERS  - PRIMARY CARE FOLLOW-UP SCHEDULING    Screening for cardiovascular condition  -lipid screening     Mixed hyperlipidemia  - Lipid panel reflex to direct LDL Non-fasting  - Comprehensive metabolic panel (BMP + Alb, Alk Phos, ALT, AST, Total. Bili, TP)    Age-related osteoporosis without current pathological fracture  -diagnosed 4/24, hence 5 years will be 0429   - Comprehensive metabolic panel (BMP + Alb, Alk Phos, ALT, AST, Total. Bili, TP)  - alendronate (FOSAMAX) 70 MG tablet  Dispense: 12 tablet; Refill: 4    Elevated liver enzymes  -2 years ago   - Comprehensive metabolic panel (BMP + Alb, Alk Phos, ALT, AST, Total. Bili, TP)    Visit for screening mammogram  - MA Screen Bilateral w/Felipe    Right shoulder tendonitis  - XR Shoulder Right G/E 3 Views  - Physical Therapy  Referral  - diclofenac (VOLTAREN) 1 % topical gel  Dispense: 150 g; Refill: 1    Encounter for immunization  - TDAP 10-64Y (ADACEL,BOOSTRIX)    Eczema, unspecified type  - triamcinolone (KENALOG) 0.1 % external ointment  Dispense: 30 g; Refill: 1      I ended our visit today by discussing the patient's diagnoses and recommended treatment. Please refer to today's diagnoses and orders for further details. I briefly discussed the pathophysiology of these conditions and outlined their expected course. I discussed the warning symptoms and signs that indicate an atypical course that would need urgent or emergent care. I also discussed self care strategies for symptom relief.  Common side effects of medications prescribed at this visit were discussed with the patient. Severe side effects, including current applicable black box warnings, were discussed.          Counseling  Appropriate preventive services were addressed with this patient via screening, questionnaire, or discussion as appropriate for fall prevention, nutrition, physical activity, Tobacco-use cessation, social engagement, weight loss and cognition.  Checklist reviewing preventive services available has been given to the patient.  Reviewed patient's diet, addressing concerns and/or questions.   Updated plan of care.  Patient reported difficulty with activities of daily living were addressed today.    Follow-up    Follow-up Visit   Expected date:  May 06, 2026 (Approximate)      Follow Up Appointment Details:     Follow-up with whom?: PCP    Follow-Up for what?: Adult Preventive    How?: In Person             Follow-up Visit   Expected date:  May 06, 2026 (Approximate)      Follow Up Appointment Details:     Follow-up with whom?: PCP    Follow-Up for what?: Adult Preventive    How?: In Person                 Subjective   Margaret is a 69 year old, presenting for the following:  Physical        5/6/2025     8:48 AM   Additional Questions   Roomed by Alcides   Accompanied by Self          Visit done with Indian interpretor     Wt Readings from Last 2 Encounters:   05/06/25 50.3 kg (111 lb)   04/19/24 49.8 kg (109 lb 12.8 oz)        Mammogram 5/24  DEXA 5/24: Osteoporosis+ ran out of medication, needs refills     Taking Calcium and Vitamin D +    Blood pressure is normal at home    Right shoulder pain:  -worse with activities  -does not wake up at night from pain   -points more to the trapezius muscle   -for 2-3 months  -does about 20 pounds of lifting  -no focal numbness  -has a hard time lifting the shoulder      Itching on the inner arms and legs sometimes+ No rash  Better with lotion Aveeno        HPI    Advance Care Planning    Discussed advance care planning with patient; informed AVS has link to [facility].        5/5/2025   General Health   How would you rate your overall physical health?  (!) FAIR   Feel stress (tense, anxious, or unable to sleep) Not at all         5/5/2025   Nutrition   Diet: Regular (no restrictions)         5/5/2025   Exercise   Days per week of moderate/strenous exercise 5 days   Average minutes spent exercising at this level 30 min         5/5/2025   Social Factors   Frequency of gathering with friends or relatives Once a week   Worry food won't last until get money to buy more No   Food not last or not have enough money for food? No   Do you have housing? (Housing is defined as stable permanent housing and does not include staying outside in a car, in a tent, in an abandoned building, in an overnight shelter, or couch-surfing.) Yes   Are you worried about losing your housing? No   Lack of transportation? No   Unable to get utilities (heat,electricity)? No         5/5/2025   Fall Risk   Fallen 2 or more times in the past year? No   Trouble with walking or balance? No          5/5/2025   Activities of Daily Living- Home Safety   Needs help with the following daily activites Transportation   Safety concerns in the home None of the above         5/5/2025   Dental   Dentist two times every year? Yes         5/5/2025   Hearing Screening   Hearing concerns? None of the above         5/5/2025   Driving Risk Screening   Patient/family members have concerns about driving No         5/5/2025   General Alertness/Fatigue Screening   Have you been more tired than usual lately? No         5/5/2025   Urinary Incontinence Screening   Bothered by leaking urine in past 6 months No         Today's PHQ-2 Score:       5/5/2025    10:58 AM   PHQ-2 ( 1999 Pfizer)   Q1: Little interest or pleasure in doing things 0   Q2: Feeling down, depressed or hopeless 0   PHQ-2 Score 0    Q1: Little interest or pleasure in doing things Not at all   Q2: Feeling down, depressed or hopeless Not at all   PHQ-2 Score 0       Patient-reported           5/5/2025   Substance Use   Alcohol more than 3/day or more than  7/wk No   Do you have a current opioid prescription? No   How severe/bad is pain from 1 to 10? 8/10   Do you use any other substances recreationally? No     Social History     Tobacco Use    Smoking status: Never    Smokeless tobacco: Never   Substance Use Topics    Alcohol use: No    Drug use: No           5/28/2024   LAST FHS-7 RESULTS   1st degree relative breast or ovarian cancer No   Any relative bilateral breast cancer No   Any male have breast cancer No   Any ONE woman have BOTH breast AND ovarian cancer No   Any woman with breast cancer before 50yrs No   2 or more relatives with breast AND/OR ovarian cancer No   2 or more relatives with breast AND/OR bowel cancer No        Mammogram Screening - Mammogram every 1-2 years updated in Health Maintenance based on mutual decision making      History of abnormal Pap smear: No - age 65 or older with adequate negative prior screening test results (3 consecutive negative cytology results, 2 consecutive negative cotesting results, or 2 consecutive negative HrHPV test results within 10 years, with the most recent test occurring within the recommended screening interval for the test used)        Latest Ref Rng & Units 9/22/2017    10:12 AM   PAP / HPV   PAP (Historical)  NIL    HPV 16 DNA NEG^Negative Negative    HPV 18 DNA NEG^Negative Negative    Other HR HPV NEG^Negative Negative      ASCVD Risk   The 10-year ASCVD risk score (Fabiana DK, et al., 2019) is: 10.1%    Values used to calculate the score:      Age: 69 years      Sex: Female      Is Non- : No      Diabetic: No      Tobacco smoker: No      Systolic Blood Pressure: 135 mmHg      Is BP treated: No      HDL Cholesterol: 62 mg/dL      Total Cholesterol: 267 mg/dL            Reviewed and updated as needed this visit by Provider   Tobacco  Allergies  Meds  Problems  Med Hx  Surg Hx  Fam Hx            History reviewed. No pertinent past medical history.  Past Surgical History:    Procedure Laterality Date    BLEPHAROPLASTY Right 12/30/2019    Procedure: Right upper eyelid blepharoplasty;  Surgeon: Sanket Bello MD;  Location: [facility] OR     Lab work is in process  Labs reviewed in EPIC  BP Readings from Last 3 Encounters:   05/06/25 135/85   04/19/24 125/70   04/18/23 124/75    Wt Readings from Last 3 Encounters:   05/06/25 50.3 kg (111 lb)   04/19/24 49.8 kg (109 lb 12.8 oz)   04/18/23 50.8 kg (112 lb)                  Patient Active Problem List   Diagnosis    Combined forms of age-related cataract of both eyes    Meibomian gland disease, unspecified laterality    Mixed hyperlipidemia    Impaired fasting glucose    Dermatochalasis of both upper eyelids    Brow ptosis    Benign paroxysmal positional vertigo of right ear    Age-related osteoporosis without current pathological fracture     Past Surgical History:   Procedure Laterality Date    BLEPHAROPLASTY Right 12/30/2019    Procedure: Right upper eyelid blepharoplasty;  Surgeon: Sanket Bello MD;  Location: [facility] OR       Social History     Tobacco Use    Smoking status: Never    Smokeless tobacco: Never   Substance Use Topics    Alcohol use: No     Family History   Problem Relation Age of Onset    Cataracts Mother     Hypertension Mother     Cancer Father     Glaucoma No family hx of     Macular Degeneration No family hx of          Current Outpatient Medications   Medication Sig Dispense Refill    alendronate (FOSAMAX) 70 MG tablet Take 1 tablet (70 mg) by mouth every 7 days. 12 tablet 4    carboxymethylcellulose PF (REFRESH PLUS) 0.5 % ophthalmic solution Place 1 drop into both eyes 3 times daily as needed for dry eyes 30 each 3    diclofenac (VOLTAREN) 1 % topical gel Apply 2 g topically 4 times daily as needed for moderate pain. 150 g 1    triamcinolone (KENALOG) 0.1 % external cream Apply topically 2 times daily 80 g 1    triamcinolone (KENALOG) 0.1 % external ointment Apply topically 2 times daily. Use for a maximum of 2 weeks  "30 g 1    VITAMIN D, CHOLECALCIFEROL, PO Take 5,000 Units by mouth daily       Allergies   Allergen Reactions    Atorvastatin      Increased LFTs improved with stopping of statin     B1-Ca     No Clinical Screening - See Comments Other (See Comments)     Vitamin B1 - feels like she is going to pass out when taking this medication     Current providers sharing in care for this patient include:  Patient Care Team:  Clinic - [facility] as PCP - Nila Mcdonough MD as Assigned PCP    The following health maintenance items are reviewed in Epic and correct as of today:  Health Maintenance   Topic Date Due    ZOSTER IMMUNIZATION (1 of 2) Never done    COVID-19 Vaccine (1 - 2024-25 season) Never done    DTAP/TDAP/TD IMMUNIZATION (3 - Td or Tdap) 01/15/2025    LIPID  04/19/2025    INFLUENZA VACCINE (Season Ended) 09/01/2025    MEDICARE ANNUAL WELLNESS VISIT  05/06/2026    ANNUAL REVIEW OF HM ORDERS  05/06/2026    FALL RISK ASSESSMENT  05/06/2026    MAMMO SCREENING  05/28/2026    COLORECTAL CANCER SCREENING  12/27/2026    DIABETES SCREENING  04/19/2027    ADVANCE CARE PLANNING  05/06/2030    RSV VACCINE (1 - 1-dose 75+ series) 08/07/2030    DEXA  05/28/2039    HEPATITIS C SCREENING  Completed    PHQ-2 (once per calendar year)  Completed    Pneumococcal Vaccine: 50+ Years  Completed    HPV IMMUNIZATION  Aged Out    MENINGITIS IMMUNIZATION  Aged Out    PAP  Discontinued         Review of Systems  Constitutional, HEENT, cardiovascular, pulmonary, gi and gu systems are negative, except as otherwise noted.     Objective    Exam  /85   Pulse 81   Temp 97.6  F (36.4  C) (Temporal)   Resp 18   Ht 1.511 m (4' 11.5\")   Wt 50.3 kg (111 lb)   LMP  (LMP Unknown)   SpO2 99%   BMI 22.04 kg/m     Estimated body mass index is 22.04 kg/m  as calculated from the following:    Height as of this encounter: 1.511 m (4' 11.5\").    Weight as of this encounter: 50.3 kg (111 lb).    Physical Exam  GENERAL " APPEARANCE: healthy, alert and no distress  EYES: Eyes grossly normal to inspection and conjunctivae and sclerae normal  RESP: lungs clear to auscultation - no rales, rhonchi or wheezes  CV: regular rates and rhythm, normal S1 S2  ABDOMEN: soft, non-tender and no rebound or guarding   MS: extremities normal- no gross deformities noted and peripheral pulses normal  SKIN: no suspicious lesions or rashes  NEURO: Normal strength and tone, mentation intact and speech normal  PSYCH: mentation appears normal  Right shoulder: no gross deformities, range of motion intact but associated with discomfort,  tender over right trapezius+, [place]oco negative           5/6/2025   Mini Cog   Mini-Cog Not Completed (choose reason) Language barrier and no  present             Signed Electronically by: Nila Montalvo MD

## 2025-05-06 NOTE — PATIENT INSTRUCTIONS
Referral Details    Referred By  Referred To   Nila Montalvo MD [address]   [facility] [address]   Phone: [phone]   Fax: [phone]    Diagnoses: Right shoulder tendonitis   Order: Physical Therapy  Referral       Comment: Please be aware that coverage of these services is subject to the terms and limitations of your health insurance plan.  Call member services at your health plan with any benefit or coverage questions.   [facility] will call you to coordinate your care as prescribed by your provider. If you don't hear from a representative within 2 business days, please call [phone].         At [facility], we strive to deliver an exceptional experience to you, every time we see you. If you receive a survey, please let us know what we are doing well and/or what we could improve upon, as we do value your feedback.  If you have MyChart, you can expect to receive results automatically within 24 hours of their completion.  Your provider will send a note interpreting your results as well.   If you do not have MyChart, you should receive your results in about a week by mail.    Your care team:                            Family Medicine Internal Medicine   MD David Badillo MD Shantel Branch-Fleming, MD Srinivasa Vaka, MD Katya Belousova, PAJaretC    Jerrod Hernández MD Pediatrics   MD Anjana Pena MD Kim Thein, APRN CNP Saray PARTIDA CNP   MD Lori Partida MD Kathleen Widmer, BOOKER Thomas PA-C Same-Day Provider (No follow-up visits)   JAQUAN Mansfield, CELY Sher-CELY Park-C     Clinic hours: Monday - Thursday 7 am-6 pm; Fridays 7 am-5 pm.   Urgent care: Monday - Friday 10 am- 8 pm; Saturday and Sunday 9 am-5 pm.    Clinic: [phone]       [facility]: Monday - Thursday 8 am - 7 pm; Friday 8 am - 6  "pm  [facility]: [phone]     Patient Education   L?i Khuyên Ch?m Sóc D? Phòng  ?ây là l?i irish suarez tôi th??ng ??a ra ?? giúp m?i ng??i kh?e m?nh. Nhóm ch?m sóc c?a quý v? có th? có l?i khuyên c? th? dành riêng cho quý v?. Vui lòng trao ??i v?i nhóm ch?m sóc v? heather c?u ch?m sóc d? phòng c?a chính quý v?.  L?i s?ng  T?p th? d?c ít nh?t 150 phút m?i tu?n (30 phút m?i ngày, 5 ngày m?t tu?n).  Th?c hi?n các ho?t ??ng t?ng c??ng c? 2 ngày m?t tu?n. ?i?u này s? giúp quý v? ki?m soát cân n?ng và ng?n ng?a b?nh t?t.  Không hút thu?c.  Thoa osmany ch?ng n?ng ?? ng?n ng?a kathryn th? da.  Ki?m tra m?c ?? radon lorraine nhà t? 2 ??n 5 n?m m?t l?n. Radon là m?t lo?i khí không màu, không mùi có th? gây h?i cho ph?i c?a quý v?. ?? tìm hi?u thêm, hãy truy c?p www.[facility].mn. và tìm ki?m \"Radon in Homes\" (Radon lorraine nhà).  Gi? súng không n?p ??n và khóa l?i ?? ? n?i an toàn nh? két s?t ho?c h?m ch?a súng, ho?c s? d?ng khóa súng và c?t chìa khóa ?i. Luôn khóa và c?t ??n ? ch? riêng. ?? tìm hi?u thêm, hãy truy c?p dps.[email or web address] và tìm ki?m \"safe gun storage\" (c?t gi? súng an toàn).  Atul d??ng  ?n ít nh?t 5 kh?u ph?n trái cây và barron qu? m?i ngày.  Hãy th? bánh mì lúa mì, g?o l?t và mì ?ng nguyên h?t (thay vì bánh mì tr?ng, g?o và mì ?ng).  N?p ?? canxi và vitamin D. Ki?m tra nhãn trên th?c ph?m và h??ng t?i 100% severino RDA (m?c tiêu th? hàng ngày ???c khuy?n ngh?).  Khám ??nh k?  Khám và v? sinh r?ng mi?ng 6 tháng m?t l?n.  G?p nhóm ch?m sóc s?c kh?e c?a quý v? hàng n?m?? trao ??i v?:  B?t k? thay ??i nào v? s?c kh?e c?a quý v?.  B?t k? lo?i thu?c nào mà nhóm ch?m sóc c?a quý v? ?ã kê ??n.  Ch?m sóc d? phòng, k? ho?ch hóa cesario ?ình và cách ng?n ng?a các b?nh mãn tính.  Tiêm ch?ng (v?c-olimpia)   Tiêm phòng HPV (??n 26 tu?i), n?u quý v? ch?a t?ng tiêm lo?i v?c-olimpia này tr??c ?ó.  Tiêm phòng viêm chau B (??n 59 tu?i), n?u quý v? ch?a t?ng tiêm lo?i v?c-olimpia này tr??c ?ó.  Tiêm phòng COVID-19: Tiêm v?c-olimpia " này khi ??n h?n.  Tiêm phòng cúm: Tiêm phòng cúm hàng n?m.  Tiêm phòng u?n ván: Tiêm phòng u?n ván 10 n?m m?t l?n.  Tiêm phòng ph? c?u khu?n, viêm chau A và RSV: Hãy h?i nhóm ch?m sóc c?a quý v? xem li?u quý v? có c?n nh?ng m?i tiêm này hay không d?a trên nguy c? bernardino?m b?nh c?a quý v?.  Tiêm phòng Anh th?n kinh (dành cho tu?i t? 50 tr? lên).  Xét barbie?m s?c kh?e t?ng quát  Sàng l?c b?nh ti?u ???ng:  B?t ??u t? tu?i 35, Khám sàng l?c b?nh ti?u ???ng ít nh?t 3 n?m m?t l?n.  N?u quý v? d??i 35 tu?i, hãy h?i nhóm ch?m sóc xem quý v? có nên sàng l?c b?nh ti?u ???ng hay không.  Xét barbie?m cholesterol: T? tu?i 39, b?t ??u xét barbie?m cholesterol 5 n?m m?t l?n, ho?c th??ng xuyên h?n n?u ???c khuy?n ngh?.  ?o m?t ?? x??ng (DEXA): ? tu?i 50, hãy h?i nhóm ch?m sóc c?a quý v? xem quý v? có nên th?c hi?n xét barbie?m này ?? phát hi?n b?nh loãng x??ng (x??ng giòn) hay không.  Viêm chau C: ?i xét barbie?m ít nh?t m?t l?n lorraine ??i.  Khám sàng l?c phình ??ng m?ch ch? b?ng: Trao ??i v?i bác s? c?a quý v? v? vi?c th?c hi?n sàng l?c này n?u quý v?:  ?ã t?ng hút thu?c; và  Là nam gi?i v? m?t sinh h?c; và  Lorraine ?? tu?i t? 65 ??n 75.  STI (b?nh bernardino?m trùng taryn ???ng tình d?c)  Tr??c 24 tu?i: Hãy h?i nhóm ch?m sóc c?a quý v? xem quý v? có nên khám sàng l?c STI hay không.  Deborah 24 tu?i: Sàng l?c STI n?u quý v? có nguy c? m?c b?nh. Quý v? có nguy c? m?c các b?nh STI (demetrio g?m c? HIV) n?u:  Quý v? có lakisha h? tình d?c tich c?c v?i h?n m?t ng??i.  Quý v? không s? d?ng demetrio burgess arteaga m?i lúc.  Quý v? ho?c b?n tình ???c ch?n ?oán m?c b?nh bernardino?m b?nh lây taryn ???ng tình d?c.  N?u quý v? có nguy c? bernardino?m HIV, hãy h?i sanam thu?c PrEP ?? ng?n ng?a HIV.  ?i xét barbie?m HIV ít nh?t m?t l?n lorraine ??i, cho dù quý v? có nguy c? bernardino?m HIV hay không.  Xét barbie?m sàng l?c kathryn th?  Sàng l?c kathryn th? c? t? cung: N?u quý v? có c? t? cung, hãy b?t ??u làm xét barbie?m sàng l?c kathryn th? c? t? cung th??ng xuyên t? tu?i 21. H?u h?t nh?ng ng??i khám sàng l?c th??billy trinidad v?i k?t qu?  bình th??ng ??u có th? ng?ng khám tia 65 tu?i. Hãy trao ??i v? v?n ?? này v?i bác s? c?a quý v?.  Quét kathryn th? vú (ch?p brittany ailin?n vú): N?u quý v? có hay t?ng có vú, hãy b?t ??u ch?p brittany ailin?n vú th??ng xuyên b?t ??u t? tu?i 40. ?ây là quá trình quét ?? ki?m tra kathryn th? vú.  Sàng l?c kathryn th? ??i tràng: C?n b?t ??u sàng l?c kathryn th? ??i tràng t? tu?i 45.  Th?c hi?n xét barbie?m n?i soi ??i tràng 10 n?m m?t l?n (ho?c th??ng xuyên h?n n?u quý v? có nguy c? m?c b?nh) Ho?c, hãy h?i nhà cung c?p c?a quý v? v? các xét barbie?m phân nh? xét barbie?m FIT hàng n?m ho?c xét barbie?m Cologuard 3 n?m m?t l?n.  ?? tìm hi?u thêm v? các tùy ch?n th? barbie?m c?a quý v?, hãy truy c?p: [email or web address].pdf.  ?? ???c h? tr? ??a ra wilder?t ??nh, hãy truy c?p: bit.ly/[email or web address].  Xét barbie?m sàng l?c kathryn th? ailin?n ti?n li?t: N?u quý v? có ailin?n ti?n li?t và ? ?? tu?i t? 55 ??n 69, hãy h?i bác s? xem vi?c xét barbie?m sàng l?c kathryn th? ailin?n ti?n li?t hàng n?m có ?em l?i l?i ích gì cho quý v? hay không.  Sàng l?c kathryn th? ph?i: N?u quý v? ?ã t?ng ho?c còn ?ang hút thu?c và t? 50 ??n 80 tu?i, hãy h?i nhóm ch?m sóc c?a quý v? xem vi?c sàng l?c kathryn th? ph?i th??ng xuyên có phù h?p v?i quý v? hay không.    Ch? nh?m m?c ?ích johnson kh?o. Không th? thay th? l?i khuyên c?a nhà cung c?p d?ch v? ch?m sóc s?c kh?e c?a quý v?. B?n wilder?n   2023 [facility].   B?o l?u m?i wilder?n. ???c ?ánh giá lâm sàng b?i [facility]. [employer] [id number] - REV 04/24.  Learning About Activities of Daily Living  What are activities of daily living?     Activities of daily living (ADLs) are the basic self-care tasks you do every day. These include eating, bathing, dressing, and moving around.  As you age, and if you have health problems, you may find that it's harder to do some of these tasks. If so, your doctor can suggest ideas that may help.  To measure what kind of help you may need, your doctor will ask how well you are able to do ADLs.  Let your doctor know if there are any tasks that you are having trouble doing. This is an important first step to getting help. And when you have the help you need, you can stay as independent as possible.  How will a doctor assess your ADLs?  Asking about ADLs is part of a routine health checkup your doctor will likely do as you age. Your health check might be done in a doctor's office, in your home, or at a hospital. The goal is to find out if you are having any problems that could make it hard to care for yourself or that make it unsafe for you to be on your own.  To measure your ADLs, your doctor will ask how hard it is for you to do routine tasks. Your doctor may also want to know if you have changed the way you do a task because of a health problem. Your doctor may watch how you:  Walk back and forth.  Keep your balance while you stand or walk.  Move from sitting to standing or from a bed to a chair.  Button or unbutton a shirt or sweater.  Remove and put on your shoes.  It's common to feel a little worried or anxious if you find you can't do all the things you used to be able to do. Talking with your doctor about ADLs is a way to make sure you're as safe as possible and able to care for yourself as well as you can. You may want to bring a caregiver, friend, or family member to your checkup. They can help you talk to your doctor.  Follow-up care is a key part of your treatment and safety. Be sure to make and go to all appointments, and call your doctor if you are having problems. It's also a good idea to know your test results and keep a list of the medicines you take.  Current as of: October 24, 2024  Content Version: 14.4    5997-8557 [employer].   Care instructions adapted under license by your healthcare professional. If you have questions about a medical condition or this instruction, always ask your healthcare professional. [employer] disclaims any warranty or liability for your  "use of this information.       Patient Education   L?i Khuyên Ch?m Sóc D? Phòng  ?ây là l?i khjuani suarez tôi th??ng ??a ra ?? giúp m?i ng??i kh?e m?nh. Nhóm ch?m sóc c?a quý v? có th? có l?i khuyên c? th? dành riêng cho quý v?. Vui lòng trao ??i v?i nhóm ch?m sóc v? heather c?u ch?m sóc d? phòng c?a chính quý v?.  L?i s?ng  T?p th? d?c ít nh?t 150 phút m?i tu?n (30 phút m?i ngày, 5 ngày m?t tu?n).  Th?c hi?n các ho?t ??ng t?ng c??ng c? 2 ngày m?t tu?n. ?i?u này s? giúp quý v? ki?m soát cân n?ng và ng?n ng?a b?nh t?t.  Không hút thu?c.  Thoa osmany ch?ng n?ng ?? ng?n ng?a kathryn th? da.  Ki?m tra m?c ?? radon lorraine nhà t? 2 ??n 5 n?m m?t l?n. Radon là m?t lo?i khí không màu, không mùi có th? gây h?i cho ph?i c?a quý v?. ?? tìm hi?u attila, nato corona c?p www.health.[facility].mn. và tìm ki?m \"Radon in Homes\" (Radon lorraine nhà).  Gi? súng không n?p ??n và khóa l?i ?? ? n?i an toàn nh? két s?t ho?c h?m ch?a súng, ho?c s? d?ng khóa súng và c?t chìa khóa ?i. Luôn khóa và c?t ??n ? ch? riêng. ?? tìm hi?u attila, nato corona c?p dps.mn.gov và tìm ki?m \"safe gun storage\" (c?t gi? súng an toàn).  Atul d??ng  ?n ít nh?t 5 kh?u ph?n trái cây và barron qu? m?i ngày.  Hãy th? bánh mì lúa mì, g?o l?t và mì ?ng nguyên h?t (thay vì bánh mì tr?ng, g?o và mì ?ng).  N?p ?? canxi và vitamin D. Ki?m tra nhãn trên th?c ph?m và h??ng t?i 100% severino RDA (m?c tiêu th? hàng ngày ???c khuy?n ngh?).  Khám ??nh k?  Khám và v? sinh r?ng mi?ng 6 tháng m?t l?n.  G?p nhóm ch?m sóc s?c kh?e c?a quý v? hàng n?m?? trao ??i v?:  B?t k? thay ??i nào v? s?c kh?e c?a quý v?.  B?t k? lo?i thu?c nào mà nhóm ch?m sóc c?a quý v? ?ã kê ??n.  Ch?m sóc d? phòng, k? ho?ch hóa cesario ?ình và cách ng?n ng?a các b?nh mãn tính.  Tiêm ch?ng (v?c-olimpia)   Tiêm phòng HPV (??n 26 tu?i), n?u quý v? ch?a t?ng tiêm lo?i v?c-olimpia này tr??c ?ó.  Tiêm phòng viêm chau B (??n 59 tu?i), n?u quý v? ch?a t?ng tiêm lo?i v?c-olimpia này tr??c ?ó.  Tiêm phòng COVID-19: Tiêm v?c-olimpia này khi ??n h?n.  Tiêm phòng cúm: " Tiêm phòng cúm hàng n?m.  Tiêm phòng u?n ván: Tiêm phòng u?n ván 10 n?m m?t l?n.  Tiêm phòng ph? c?u khu?n, viêm chau A và RSV: Hãy h?i nhóm ch?m sóc c?a quý v? xem li?u quý v? có c?n nh?ng m?i tiêm này hay không d?a trên nguy c? bernardino?m b?nh c?a quý v?.  Tiêm phòng Anh th?n kinh (dành cho tu?i t? 50 tr? lên).  Xét barbie?m s?c kh?e t?ng quát  Sàng l?c b?nh ti?u ???ng:  B?t ??u t? tu?i 35, Khám sàng l?c b?nh ti?u ???ng ít nh?t 3 n?m m?t l?n.  N?u quý v? d??i 35 tu?i, hãy h?i nhóm ch?m sóc xem quý v? có nên sàng l?c b?nh ti?u ???ng hay không.  Xét barbie?m cholesterol: T? tu?i 39, b?t ??u xét barbie?m cholesterol 5 n?m m?t l?n, ho?c th??ng xuyên h?n n?u ???c khuy?n ngh?.  ?o m?t ?? x??ng (DEXA): ? tu?i 50, hãy h?i nhóm ch?m sóc c?a quý v? xem quý v? có nên th?c hi?n xét barbie?m này ?? phát hi?n b?nh loãng x??ng (x??ng giòn) hay không.  Viêm chau C: ?i xét barbie?m ít nh?t m?t l?n lorraine ??i.  Khám sàng l?c phình ??ng m?ch ch? b?ng: Trao ??i v?i bác s? c?a quý v? v? vi?c th?c hi?n sàng l?c này n?u quý v?:  ?ã t?ng hút thu?c; và  Là nam gi?i v? m?t sinh h?c; và  Lorraine ?? tu?i t? 65 ??n 75.  STI (b?nh bernardino?m trùng taryn ???ng tình d?c)  Tr??c 24 tu?i: Hãy h?i nhóm ch?m sóc c?a quý v? xem quý v? có nên khám sàng l?c STI hay không.  Deborah 24 tu?i: Sàng l?c STI n?u quý v? có nguy c? m?c b?nh. Quý v? có nguy c? m?c các b?nh STI (demetrio g?m c? HIV) n?u:  Quý v? có lakisha h? tình d?c tich c?c v?i h?n m?t ng??i.  Quý v? không s? d?ng demetrio burgess arteaga m?i lúc.  Quý v? ho?c b?n tình ???c ch?n ?oán m?c b?nh bernardino?m b?nh lây taryn ???ng tình d?c.  N?u quý v? có nguy c? bernardino?m HIV, hãy h?i sanam thu?c PrEP ?? ng?n ng?a HIV.  ?i xét barbie?m HIV ít nh?t m?t l?n lorraine ??i, cho dù quý v? có nguy c? bernardino?m HIV hay không.  Xét barbie?m sàng l?c kathryn th?  Sàng l?c kathryn th? c? t? cung: N?u quý v? có c? t? cung, hãy b?t ??u làm xét barbie?m sàng l?c kathryn th? c? t? cung th??ng xuyên t? tu?i 21. H?u h?t nh?ng ng??i khám sàng l?c th??ng xuyên v?i k?t qu? bình th??ng ??u có th? ng?billy mcmahon  tia 65 tu?i. Hãy trao ??i v? v?n ?? này v?i bác s? c?a quý v?.  Quét kathryn th? vú (ch?p brittany ailin?n vú): N?u quý v? có hay t?ng có vú, hãy b?t ??u ch?p brittany ailin?n vú th??ng xuyên b?t ??u t? tu?i 40. ?ây là quá trình quét ?? ki?m tra kathryn th? vú.  Sàng l?c kathryn th? ??i tràng: C?n b?t ??u sàng l?c kathryn th? ??i tràng t? tu?i 45.  Th?c hi?n xét barbie?m n?i soi ??i tràng 10 n?m m?t l?n (ho?c th??ng xuyên h?n n?u quý v? có nguy c? m?c b?nh) Ho?c, hãy h?i nhà cung c?p c?a quý v? v? các xét barbie?m phân nh? xét barbie?m FIT hàng n?m ho?c xét barbie?m Cologuard 3 n?m m?t l?n.  ?? tìm hi?u thêm v? các tùy ch?n th? barbie?m c?a quý v?, hãy truy c?p: www.[employer][id number].pdf.  ?? ???c h? tr? ??a ra wilder?t ??nh, hãy truy c?p: bit.ly/[id number].  Xét barbie?m sàng l?c kathryn th? ailin?n ti?n li?t: N?u quý v? có ailin?n ti?n li?t và ? ?? tu?i t? 55 ??n 69, hãy h?i bác s? xem vi?c xét barbie?m sàng l?c kathryn th? ailin?n ti?n li?t hàng n?m có ?em l?i l?i ích gì cho quý v? hay không.  Sàng l?c kathryn th? ph?i: N?u quý v? ?ã t?ng ho?c còn ?ang hút thu?c và t? 50 ??n 80 tu?i, hãy h?i nhóm ch?m sóc c?a quý v? xem vi?c sàng l?c kathryn th? ph?i th??ng xuyên có phù h?p v?i quý v? hay không.    Ch? nh?m m?c ?ích johnson kh?o. Không th? thay th? l?i khuyên c?a nhà cung c?p d?ch v? ch?m sóc s?c kh?e c?a quý v?. B?n wilder?n   2023 [facility].   B?o l?u m?i wilder?n. ???c ?ánh giá lâm sàng b?i [facility]. [employer] [id number] - REV 04/24.  Learning About Activities of Daily Living  What are activities of daily living?     Activities of daily living (ADLs) are the basic self-care tasks you do every day. These include eating, bathing, dressing, and moving around.  As you age, and if you have health problems, you may find that it's harder to do some of these tasks. If so, your doctor can suggest ideas that may help.  To measure what kind of help you may need, your doctor will ask how well you are able to do ADLs. Let your doctor know if there are  any tasks that you are having trouble doing. This is an important first step to getting help. And when you have the help you need, you can stay as independent as possible.  How will a doctor assess your ADLs?  Asking about ADLs is part of a routine health checkup your doctor will likely do as you age. Your health check might be done in a doctor's office, in your home, or at a hospital. The goal is to find out if you are having any problems that could make it hard to care for yourself or that make it unsafe for you to be on your own.  To measure your ADLs, your doctor will ask how hard it is for you to do routine tasks. Your doctor may also want to know if you have changed the way you do a task because of a health problem. Your doctor may watch how you:  Walk back and forth.  Keep your balance while you stand or walk.  Move from sitting to standing or from a bed to a chair.  Button or unbutton a shirt or sweater.  Remove and put on your shoes.  It's common to feel a little worried or anxious if you find you can't do all the things you used to be able to do. Talking with your doctor about ADLs is a way to make sure you're as safe as possible and able to care for yourself as well as you can. You may want to bring a caregiver, friend, or family member to your checkup. They can help you talk to your doctor.  Follow-up care is a key part of your treatment and safety. Be sure to make and go to all appointments, and call your doctor if you are having problems. It's also a good idea to know your test results and keep a list of the medicines you take.  Current as of: October 24, 2024  Content Version: 14.4    4373-6540 [employer].   Care instructions adapted under license by your healthcare professional. If you have questions about a medical condition or this instruction, always ask your healthcare professional. [employer] disclaims any warranty or liability for your use of this information.

## 2025-05-07 ENCOUNTER — RESULTS FOLLOW-UP (OUTPATIENT)
Dept: FAMILY MEDICINE | Facility: CLINIC | Age: 70
End: 2025-05-07
Payer: COMMERCIAL

## 2025-05-07 NOTE — LETTER
May 13, 2025      Margaret Healy  [address] [place] [address]        To Whom It May Concern:    Margaret Healy  was seen on 5/6/25. Please allow lifting restrictions of up to 10 pounds with right upper extremity for the next 4 weeks.    Please let me know if there are any questions.     Sincerely,        Nila Montalvo MD    Electronically signed

## 2025-05-07 NOTE — RESULT ENCOUNTER NOTE
Margaret,     X-rays of the shoulder are not showing any fractures.  Mild arthritis seen.  Plan of care and follow-up as discussed in clinic.    Please do not hesitate to call us at [phone] if you have any questions or concerns.    Thank you,    Nila Montalvo MD MPH

## 2025-05-13 NOTE — TELEPHONE ENCOUNTER
I do not see that the patient is scheduled with Physical therapy, I would recommend working with the physical therapist.    Also, I just want to clarify that work restrictions would be for 4 weeks only while patient works with Physical therapy.    Letter generated. Letter is available via My Chart.     Thank you,  Nila Montalvo MD MPH

## 2025-05-28 ENCOUNTER — APPOINTMENT (OUTPATIENT)
Dept: INTERPRETER SERVICES | Facility: CLINIC | Age: 70
End: 2025-05-28
Payer: COMMERCIAL

## 2025-07-15 ENCOUNTER — ANCILLARY PROCEDURE (OUTPATIENT)
Dept: ULTRASOUND IMAGING | Facility: CLINIC | Age: 70
End: 2025-07-15
Attending: PREVENTIVE MEDICINE
Payer: COMMERCIAL

## 2025-07-15 ENCOUNTER — ANCILLARY PROCEDURE (OUTPATIENT)
Dept: MAMMOGRAPHY | Facility: CLINIC | Age: 70
End: 2025-07-15
Attending: PREVENTIVE MEDICINE
Payer: COMMERCIAL

## 2025-07-15 DIAGNOSIS — R74.8 ELEVATED LIVER ENZYMES: ICD-10-CM

## 2025-07-15 DIAGNOSIS — Z12.31 VISIT FOR SCREENING MAMMOGRAM: ICD-10-CM

## 2025-07-15 PROCEDURE — 77067 SCR MAMMO BI INCL CAD: CPT | Mod: TC | Performed by: RADIOLOGY

## 2025-07-15 PROCEDURE — 76705 ECHO EXAM OF ABDOMEN: CPT | Mod: TC | Performed by: RADIOLOGY

## 2025-07-15 PROCEDURE — 77063 BREAST TOMOSYNTHESIS BI: CPT | Mod: TC | Performed by: RADIOLOGY

## 2025-09-02 ENCOUNTER — ANCILLARY PROCEDURE (OUTPATIENT)
Dept: MRI IMAGING | Facility: CLINIC | Age: 70
End: 2025-09-02
Attending: PREVENTIVE MEDICINE
Payer: COMMERCIAL

## 2025-09-02 DIAGNOSIS — R74.8 ELEVATED LIVER ENZYMES: ICD-10-CM

## 2025-09-02 DIAGNOSIS — K83.8 DILATION OF COMMON BILE DUCT: ICD-10-CM

## 2025-09-02 PROCEDURE — 74183 MRI ABD W/O CNTR FLWD CNTR: CPT | Mod: TC | Performed by: RADIOLOGY

## 2025-09-02 PROCEDURE — A9585 GADOBUTROL INJECTION: HCPCS | Performed by: RADIOLOGY

## 2025-09-02 RX ORDER — GADOBUTROL 604.72 MG/ML
5 INJECTION INTRAVENOUS ONCE
Status: COMPLETED | OUTPATIENT
Start: 2025-09-02 | End: 2025-09-02

## 2025-09-02 RX ADMIN — GADOBUTROL 5 ML: 604.72 INJECTION INTRAVENOUS at 12:22

## (undated) DEVICE — MARKER SKIN DOUBLE TIP W/FLEXI-RULER W/LABELS

## (undated) DEVICE — NDL 30GA 0.5" 305106

## (undated) DEVICE — SOL WATER IRRIG 1000ML BOTTLE 07139-09

## (undated) DEVICE — SYR 03ML LL W/O NDL

## (undated) DEVICE — ESU EYE HIGH TEMP 65410-183

## (undated) DEVICE — ESU PENCIL W/HOLSTER

## (undated) DEVICE — PACK MINOR EYE

## (undated) DEVICE — ESU ELEC NDL 1" COATED/INSULATED E1465

## (undated) DEVICE — GLOVE PROTEXIS W/NEU-THERA 7.5  2D73TE75

## (undated) RX ORDER — ACETAMINOPHEN 325 MG/1
TABLET ORAL
Status: DISPENSED
Start: 2019-12-30

## (undated) RX ORDER — FENTANYL CITRATE 50 UG/ML
INJECTION, SOLUTION INTRAMUSCULAR; INTRAVENOUS
Status: DISPENSED
Start: 2019-12-30

## (undated) RX ORDER — ONDANSETRON 2 MG/ML
INJECTION INTRAMUSCULAR; INTRAVENOUS
Status: DISPENSED
Start: 2019-12-30

## (undated) RX ORDER — PROPOFOL 10 MG/ML
INJECTION, EMULSION INTRAVENOUS
Status: DISPENSED
Start: 2019-12-30

## (undated) RX ORDER — DEXAMETHASONE SODIUM PHOSPHATE 4 MG/ML
INJECTION, SOLUTION INTRA-ARTICULAR; INTRALESIONAL; INTRAMUSCULAR; INTRAVENOUS; SOFT TISSUE
Status: DISPENSED
Start: 2019-12-30

## (undated) RX ORDER — GABAPENTIN 300 MG/1
CAPSULE ORAL
Status: DISPENSED
Start: 2019-12-30